# Patient Record
Sex: FEMALE | Race: WHITE | NOT HISPANIC OR LATINO | Employment: OTHER | URBAN - METROPOLITAN AREA
[De-identification: names, ages, dates, MRNs, and addresses within clinical notes are randomized per-mention and may not be internally consistent; named-entity substitution may affect disease eponyms.]

---

## 2024-01-29 ENCOUNTER — TELEPHONE (OUTPATIENT)
Dept: PRIMARY CARE | Facility: CLINIC | Age: 69
End: 2024-01-29
Payer: MEDICARE

## 2024-01-29 DIAGNOSIS — R19.7 DIARRHEA OF PRESUMED INFECTIOUS ORIGIN: Primary | ICD-10-CM

## 2024-01-29 NOTE — TELEPHONE ENCOUNTER
Pt states went to UC 3 wks ago,dx bronchitis, given steroids. Had Colonoscopy 1/22 - loose stools ever since. Still has cough. Covid test neg 1/28, and neg 1/22.  Please advise.

## 2024-01-29 NOTE — TELEPHONE ENCOUNTER
Advised pt of lab stool orders. Advised pt need to get containers and any instructions from lab at  Lab.

## 2024-01-30 ENCOUNTER — HOSPITAL ENCOUNTER (OUTPATIENT)
Dept: RADIOLOGY | Facility: EXTERNAL LOCATION | Age: 69
Discharge: HOME | End: 2024-01-30
Payer: MEDICARE

## 2024-01-30 DIAGNOSIS — R05.9 COUGH, UNSPECIFIED TYPE: ICD-10-CM

## 2024-02-02 DIAGNOSIS — R53.83 OTHER FATIGUE: Primary | ICD-10-CM

## 2024-02-02 RX ORDER — ATORVASTATIN CALCIUM 10 MG/1
TABLET, FILM COATED ORAL EVERY 24 HOURS
COMMUNITY
Start: 2015-08-26 | End: 2024-02-02 | Stop reason: SDUPTHER

## 2024-02-02 RX ORDER — LEVOTHYROXINE SODIUM 50 UG/1
50 TABLET ORAL
Qty: 90 TABLET | Refills: 3 | Status: SHIPPED | OUTPATIENT
Start: 2024-02-02 | End: 2024-02-05 | Stop reason: SDUPTHER

## 2024-02-02 RX ORDER — PRAMIPEXOLE DIHYDROCHLORIDE 0.5 MG/1
TABLET ORAL EVERY 24 HOURS
COMMUNITY
Start: 2020-02-10 | End: 2024-02-02 | Stop reason: SDUPTHER

## 2024-02-02 RX ORDER — LEVOTHYROXINE SODIUM 50 UG/1
TABLET ORAL EVERY 24 HOURS
COMMUNITY
Start: 2017-03-06 | End: 2024-02-02 | Stop reason: SDUPTHER

## 2024-02-02 RX ORDER — AMLODIPINE AND BENAZEPRIL HYDROCHLORIDE 5; 10 MG/1; MG/1
1 CAPSULE ORAL DAILY
Qty: 90 CAPSULE | Refills: 3 | Status: SHIPPED | OUTPATIENT
Start: 2024-02-02 | End: 2024-02-05 | Stop reason: SDUPTHER

## 2024-02-02 RX ORDER — PRAMIPEXOLE DIHYDROCHLORIDE 0.5 MG/1
0.5 TABLET ORAL 2 TIMES DAILY
Qty: 180 TABLET | Refills: 3 | Status: SHIPPED | OUTPATIENT
Start: 2024-02-02 | End: 2024-02-05 | Stop reason: SDUPTHER

## 2024-02-02 RX ORDER — FENOFIBRATE 150 MG/1
CAPSULE ORAL
COMMUNITY
Start: 2009-11-25 | End: 2024-02-02 | Stop reason: SDUPTHER

## 2024-02-02 RX ORDER — AMLODIPINE AND BENAZEPRIL HYDROCHLORIDE 5; 10 MG/1; MG/1
CAPSULE ORAL
COMMUNITY
Start: 2014-04-04 | End: 2024-02-02 | Stop reason: SDUPTHER

## 2024-02-02 RX ORDER — FENOFIBRATE 150 MG/1
134 CAPSULE ORAL DAILY
Qty: 90 CAPSULE | Refills: 3 | Status: SHIPPED | OUTPATIENT
Start: 2024-02-02 | End: 2024-02-05

## 2024-02-02 RX ORDER — ATORVASTATIN CALCIUM 10 MG/1
10 TABLET, FILM COATED ORAL DAILY
Qty: 90 TABLET | Refills: 3 | Status: SHIPPED | OUTPATIENT
Start: 2024-02-02 | End: 2024-02-05 | Stop reason: SDUPTHER

## 2024-02-05 ENCOUNTER — TELEPHONE (OUTPATIENT)
Dept: PRIMARY CARE | Facility: CLINIC | Age: 69
End: 2024-02-05
Payer: MEDICARE

## 2024-02-05 DIAGNOSIS — R53.83 OTHER FATIGUE: ICD-10-CM

## 2024-02-05 RX ORDER — FENOFIBRATE 134 MG/1
134 CAPSULE ORAL
Qty: 90 CAPSULE | Refills: 3 | Status: SHIPPED | OUTPATIENT
Start: 2024-02-05

## 2024-02-05 RX ORDER — FENOFIBRATE 134 MG/1
CAPSULE ORAL
COMMUNITY
Start: 2018-03-28 | End: 2024-02-05 | Stop reason: SDUPTHER

## 2024-02-05 RX ORDER — LEVOTHYROXINE SODIUM 50 UG/1
50 TABLET ORAL
Qty: 90 TABLET | Refills: 3 | Status: SHIPPED | OUTPATIENT
Start: 2024-02-05

## 2024-02-05 RX ORDER — PRAMIPEXOLE DIHYDROCHLORIDE 0.5 MG/1
0.5 TABLET ORAL 2 TIMES DAILY
Qty: 180 TABLET | Refills: 3 | Status: SHIPPED | OUTPATIENT
Start: 2024-02-05

## 2024-02-05 RX ORDER — AMLODIPINE AND BENAZEPRIL HYDROCHLORIDE 5; 10 MG/1; MG/1
1 CAPSULE ORAL DAILY
Qty: 90 CAPSULE | Refills: 3 | Status: SHIPPED | OUTPATIENT
Start: 2024-02-05

## 2024-02-05 RX ORDER — ATORVASTATIN CALCIUM 10 MG/1
10 TABLET, FILM COATED ORAL DAILY
Qty: 90 TABLET | Refills: 3 | Status: SHIPPED | OUTPATIENT
Start: 2024-02-05

## 2024-02-05 NOTE — TELEPHONE ENCOUNTER
Refills    [facility] mailorder    Amlodipine -hydrochlorothiazide 5-10 mg  Atorvastatin 10 mg  Fenofibrate 134 mg cap  Pramipexole dihyrochloride 0.5 mg tab  Synthroid 50 mcg tab    90 days w./ 3 refills

## 2024-06-12 ENCOUNTER — LAB (OUTPATIENT)
Dept: LAB | Facility: LAB | Age: 69
End: 2024-06-12
Payer: MEDICARE

## 2024-06-12 ENCOUNTER — OFFICE VISIT (OUTPATIENT)
Dept: PRIMARY CARE | Facility: CLINIC | Age: 69
End: 2024-06-12
Payer: MEDICARE

## 2024-06-12 VITALS
TEMPERATURE: 97.2 F | BODY MASS INDEX: 24.12 KG/M2 | DIASTOLIC BLOOD PRESSURE: 80 MMHG | SYSTOLIC BLOOD PRESSURE: 122 MMHG | HEART RATE: 78 BPM | WEIGHT: 154 LBS | OXYGEN SATURATION: 97 %

## 2024-06-12 DIAGNOSIS — E11.69 TYPE 2 DIABETES MELLITUS WITH OTHER SPECIFIED COMPLICATION, WITHOUT LONG-TERM CURRENT USE OF INSULIN (MULTI): ICD-10-CM

## 2024-06-12 DIAGNOSIS — Z01.89 ENCOUNTER FOR ROUTINE LABORATORY TESTING: Primary | ICD-10-CM

## 2024-06-12 LAB
ANION GAP SERPL CALC-SCNC: 12 MMOL/L
BUN SERPL-MCNC: 22 MG/DL (ref 8–25)
CALCIUM SERPL-MCNC: 10.6 MG/DL (ref 8.5–10.4)
CHLORIDE SERPL-SCNC: 100 MMOL/L (ref 97–107)
CO2 SERPL-SCNC: 27 MMOL/L (ref 24–31)
CREAT SERPL-MCNC: 0.8 MG/DL (ref 0.4–1.6)
EGFRCR SERPLBLD CKD-EPI 2021: 80 ML/MIN/1.73M*2
EST. AVERAGE GLUCOSE BLD GHB EST-MCNC: 226 MG/DL
GLUCOSE SERPL-MCNC: 223 MG/DL (ref 65–99)
HBA1C MFR BLD: 9.5 %
POC HEMOGLOBIN A1C: 9.4 % (ref 4.2–6.5)
POTASSIUM SERPL-SCNC: 5.8 MMOL/L (ref 3.4–5.1)
SODIUM SERPL-SCNC: 139 MMOL/L (ref 133–145)

## 2024-06-12 PROCEDURE — 83036 HEMOGLOBIN GLYCOSYLATED A1C: CPT

## 2024-06-12 PROCEDURE — 99214 OFFICE O/P EST MOD 30 MIN: CPT | Performed by: LICENSED PRACTICAL NURSE

## 2024-06-12 PROCEDURE — 1125F AMNT PAIN NOTED PAIN PRSNT: CPT | Performed by: LICENSED PRACTICAL NURSE

## 2024-06-12 PROCEDURE — 3079F DIAST BP 80-89 MM HG: CPT | Performed by: LICENSED PRACTICAL NURSE

## 2024-06-12 PROCEDURE — 1160F RVW MEDS BY RX/DR IN RCRD: CPT | Performed by: LICENSED PRACTICAL NURSE

## 2024-06-12 PROCEDURE — 1036F TOBACCO NON-USER: CPT | Performed by: LICENSED PRACTICAL NURSE

## 2024-06-12 PROCEDURE — 83036 HEMOGLOBIN GLYCOSYLATED A1C: CPT | Mod: QW | Performed by: LICENSED PRACTICAL NURSE

## 2024-06-12 PROCEDURE — 80048 BASIC METABOLIC PNL TOTAL CA: CPT

## 2024-06-12 PROCEDURE — 36415 COLL VENOUS BLD VENIPUNCTURE: CPT

## 2024-06-12 PROCEDURE — 1159F MED LIST DOCD IN RCRD: CPT | Performed by: LICENSED PRACTICAL NURSE

## 2024-06-12 PROCEDURE — 3074F SYST BP LT 130 MM HG: CPT | Performed by: LICENSED PRACTICAL NURSE

## 2024-06-12 RX ORDER — METFORMIN HYDROCHLORIDE 500 MG/1
500 TABLET ORAL
Qty: 90 TABLET | Refills: 3 | Status: SHIPPED | OUTPATIENT
Start: 2024-06-12

## 2024-06-12 RX ORDER — FERROUS SULFATE 325(65) MG
325 TABLET ORAL
COMMUNITY

## 2024-06-12 ASSESSMENT — ENCOUNTER SYMPTOMS
POLYPHAGIA: 1
POLYDIPSIA: 0
FATIGUE: 1

## 2024-06-12 ASSESSMENT — PATIENT HEALTH QUESTIONNAIRE - PHQ9
SUM OF ALL RESPONSES TO PHQ9 QUESTIONS 1 AND 2: 0
1. LITTLE INTEREST OR PLEASURE IN DOING THINGS: NOT AT ALL
2. FEELING DOWN, DEPRESSED OR HOPELESS: NOT AT ALL

## 2024-06-12 ASSESSMENT — PAIN SCALES - GENERAL: PAINLEVEL: 4

## 2024-06-12 NOTE — PROGRESS NOTES
"[facility]: [facility]  PROGRESS NOTE      Tavia Banks is a 68 y.o. female that is presenting today for Follow-up.      Subjective   Ms. Banks is a 68yr old female who presents to the office for has a PMH of prediabetes, hypertension and hypothyroidism. Ms. Banks reports being tired \"all the time\". She also shares that her glucose readings are generally high with readings from 279-300s when she randomly checks her sugar level. She reports being diagnosed with pre-diabetes a few years ago. Since that time she has not had much follow up on the issue. She reports \"always being hungry\", however she denies increased thirst or urinary frequency.      Review of Systems   Constitutional:  Positive for fatigue.   Endocrine: Positive for polyphagia. Negative for polydipsia and polyuria.      Objective   Vitals:    06/12/24 0834   BP: 122/80   Pulse: 78   Temp: 36.2 °C (97.2 °F)   SpO2: 97%      Body mass index is 24.12 kg/m².  Physical Exam  Constitutional:       General: She is not in acute distress.     Appearance: She is not ill-appearing, toxic-appearing or diaphoretic.   Cardiovascular:      Rate and Rhythm: Normal rate and regular rhythm.      Pulses: Normal pulses.      Heart sounds: Normal heart sounds.   Pulmonary:      Effort: Pulmonary effort is normal. No respiratory distress.      Breath sounds: No wheezing or rales.   Abdominal:      General: Bowel sounds are normal. There is no distension.   Skin:     General: Skin is warm and dry.       Diagnostic Results   Lab Results   Component Value Date    GLUCOSE 143 (H) 09/10/2021    CALCIUM 9.8 09/10/2021     09/10/2021    K 4.8 09/10/2021    CO2 26 09/10/2021     09/10/2021    BUN 24 09/10/2021    CREATININE 0.8 09/10/2021     Lab Results   Component Value Date    ALT 27 09/10/2021    AST 21 09/10/2021    ALKPHOS 52 09/10/2021    BILITOT 0.4 09/10/2021     Lab Results   Component Value Date    WBC 6.1 09/10/2021    HGB 12.9 " "09/10/2021    HCT 40.4 09/10/2021    MCV 96.0 09/10/2021     09/10/2021     Lab Results   Component Value Date    CHOL 197 09/10/2021    CHOL 196 11/20/2020    CHOL 189 08/05/2019     Lab Results   Component Value Date    HDL 53 09/10/2021    HDL 50.6 11/20/2020    HDL 52.9 08/05/2019     Lab Results   Component Value Date    LDLCALC 116 09/10/2021     Lab Results   Component Value Date    TRIG 138 09/10/2021    TRIG 148 11/20/2020    TRIG 146 08/05/2019     No components found for: \"CHOLHDL\"  Lab Results   Component Value Date    HGBA1C 9.4 (A) 06/12/2024     Other labs not included in the list above were reviewed either before or during this encounter.    History    Past Medical History:   Diagnosis Date    Abnormal results of thyroid function studies 08/09/2019    Abnormal thyroid function test    Essential (primary) hypertension 05/15/2020    Benign essential hypertension    Impaired fasting glucose 03/06/2017    IFG (impaired fasting glucose)    Other specified disorders of synovium and tendon, other site 09/11/2015    Achilles tendinosis    Personal history of other mental and behavioral disorders 10/09/2017    History of depression    Restless legs syndrome 02/10/2020    Restless legs syndrome    Unspecified symptoms and signs involving the genitourinary system 05/15/2020    Urinary symptom or sign     Past Surgical History:   Procedure Laterality Date    COLONOSCOPY  04/08/2014    Complete Colonoscopy    HYSTERECTOMY  04/08/2014    Hysterectomy    TOTAL KNEE ARTHROPLASTY  04/08/2014    Knee Replacement     No family history on file.  Social History     Socioeconomic History    Marital status:      Spouse name: Not on file    Number of children: Not on file    Years of education: Not on file    Highest education level: Not on file   Occupational History    Not on file   Tobacco Use    Smoking status: Never    Smokeless tobacco: Never   Vaping Use    Vaping status: Never Used   Substance and " Sexual Activity    Alcohol use: Never    Drug use: Never    Sexual activity: Not on file   Other Topics Concern    Not on file   Social History Narrative    Not on file     Social Determinants of Health     Financial Resource Strain: Not on file   Food Insecurity: Not on file   Transportation Needs: Not on file   Physical Activity: Not on file   Stress: Not on file   Social Connections: Not on file   Intimate Partner Violence: Not on file   Housing Stability: Not on file     No Known Allergies  Current Outpatient Medications on File Prior to Visit   Medication Sig Dispense Refill    amLODIPine-benazepriL (Lotrel) 5-10 mg capsule Take 1 capsule by mouth once daily. 90 capsule 3    atorvastatin (Lipitor) 10 mg tablet Take 1 tablet (10 mg) by mouth once daily. 90 tablet 3    fenofibrate micronized (Lofibra) 134 mg capsule Take 1 capsule (134 mg) by mouth once daily with breakfast. 90 capsule 3    ferrous sulfate, 325 mg ferrous sulfate, (iron) tablet Take 1 tablet by mouth once daily with breakfast.      levothyroxine (Synthroid, Levoxyl) 50 mcg tablet Take 1 tablet (50 mcg) by mouth once daily in the morning. Take before meals. 90 tablet 3    pramipexole (Mirapex) 0.5 mg tablet Take 1 tablet (0.5 mg) by mouth 2 times a day. 180 tablet 3     No current facility-administered medications on file prior to visit.     Immunization History   Administered Date(s) Administered    Moderna SARS-CoV-2 Vaccination 03/02/2021, 03/30/2021    Pfizer COVID-19 vaccine, Fall 2023, 12 years and older, (30mcg/0.3mL) 10/18/2023    Pfizer COVID-19 vaccine, bivalent, age 12 years and older (30 mcg/0.3 mL) 01/09/2023, 07/05/2023    Pfizer Gray Cap SARS-CoV-2 05/18/2022    Pfizer Purple Cap SARS-CoV-2 11/12/2021     Patient's medical history was reviewed and updated either before or during this encounter.       Assessment/Plan   Problem List Items Addressed This Visit    None  Visit Diagnoses       Encounter for routine laboratory testing    -   Primary    Type 2 diabetes mellitus with other specified complication, without long-term current use of insulin (Multi)        Relevant Medications    metFORMIN (Glucophage) 500 mg tablet    Other Relevant Orders    Hemoglobin A1C    Basic Metabolic Panel    POCT glycosylated hemoglobin (Hb A1C) manually resulted (Completed)        Ms. Pruitt A1C is 9.5 today. I will start her on Metformin 500mg daily x 1 week then increase to 500mg BID. I will also order a BMP to evaluate her renal function. Finally I will refer her to Nutritional Services for diabetic education. She will monitor her BS BID and follow back up in the office in 3 weeks.      Herberth Bhatt, APRN-CNP

## 2024-06-13 DIAGNOSIS — E87.5 HYPERKALEMIA: Primary | ICD-10-CM

## 2024-06-14 PROBLEM — E11.9 DIABETES MELLITUS WITHOUT COMPLICATION (MULTI): Status: ACTIVE | Noted: 2024-06-14

## 2024-06-14 PROBLEM — L57.9 SKIN CHANGES DUE TO CHRONIC EXPOSURE TO NONIONIZING RADIATION, UNSPECIFIED: Status: ACTIVE | Noted: 2017-08-09

## 2024-06-14 PROBLEM — Z20.822 ENCOUNTER FOR LABORATORY TESTING FOR COVID-19 VIRUS: Status: RESOLVED | Noted: 2024-06-14 | Resolved: 2024-06-14

## 2024-06-14 PROBLEM — J20.9 ACUTE BRONCHITIS: Status: RESOLVED | Noted: 2022-10-07 | Resolved: 2024-06-14

## 2024-06-14 PROBLEM — D22.5 MELANOCYTIC NEVI OF TRUNK: Status: ACTIVE | Noted: 2017-08-09

## 2024-06-14 PROBLEM — L82.1 OTHER SEBORRHEIC KERATOSIS: Status: ACTIVE | Noted: 2017-08-09

## 2024-06-14 PROBLEM — R50.9 FEVER: Status: RESOLVED | Noted: 2024-06-14 | Resolved: 2024-06-14

## 2024-06-14 PROBLEM — E55.9 VITAMIN D DEFICIENCY: Status: ACTIVE | Noted: 2024-06-14

## 2024-06-14 PROBLEM — D23.62 OTHER BENIGN NEOPLASM OF SKIN OF LEFT UPPER LIMB, INCLUDING SHOULDER: Status: ACTIVE | Noted: 2017-08-09

## 2024-06-14 PROBLEM — I10 HYPERTENSION: Status: ACTIVE | Noted: 2024-06-14

## 2024-06-14 PROBLEM — N95.1 MENOPAUSAL STATE: Status: ACTIVE | Noted: 2024-06-14

## 2024-06-14 PROBLEM — M19.049 OSTEOARTHRITIS OF HAND: Status: ACTIVE | Noted: 2024-06-14

## 2024-06-14 PROBLEM — R05.9 COUGH, UNSPECIFIED: Status: RESOLVED | Noted: 2022-10-07 | Resolved: 2024-06-14

## 2024-06-14 PROBLEM — I10 BENIGN ESSENTIAL HYPERTENSION: Status: ACTIVE | Noted: 2024-06-14

## 2024-06-14 PROBLEM — M62.81 QUADRICEPS WEAKNESS: Status: ACTIVE | Noted: 2024-06-14

## 2024-06-14 PROBLEM — G25.81 RESTLESS LEGS SYNDROME: Status: ACTIVE | Noted: 2024-06-14

## 2024-06-14 PROBLEM — M21.40 PES PLANUS: Status: ACTIVE | Noted: 2024-06-14

## 2024-06-14 PROBLEM — R35.0 URINARY FREQUENCY: Status: ACTIVE | Noted: 2024-06-14

## 2024-06-14 PROBLEM — L81.4 OTHER MELANIN HYPERPIGMENTATION: Status: ACTIVE | Noted: 2017-08-09

## 2024-06-14 PROBLEM — R73.03 PREDIABETES: Status: ACTIVE | Noted: 2024-06-14

## 2024-06-14 PROBLEM — M19.042 PRIMARY OSTEOARTHRITIS OF BOTH HANDS: Status: ACTIVE | Noted: 2024-06-14

## 2024-06-14 PROBLEM — E78.5 HYPERLIPIDEMIA: Status: ACTIVE | Noted: 2024-06-14

## 2024-06-14 PROBLEM — E03.9 HYPOTHYROIDISM: Status: ACTIVE | Noted: 2024-06-14

## 2024-06-14 PROBLEM — M19.041 PRIMARY OSTEOARTHRITIS OF BOTH HANDS: Status: ACTIVE | Noted: 2024-06-14

## 2024-06-14 PROBLEM — D18.01 HEMANGIOMA OF SKIN AND SUBCUTANEOUS TISSUE: Status: ACTIVE | Noted: 2017-08-09

## 2024-06-14 PROBLEM — Z90.710 HISTORY OF HYSTERECTOMY: Status: RESOLVED | Noted: 2024-06-14 | Resolved: 2024-06-14

## 2024-06-14 PROBLEM — L98.9 SKIN LESIONS: Status: ACTIVE | Noted: 2024-06-14

## 2024-06-15 ENCOUNTER — LAB (OUTPATIENT)
Dept: LAB | Facility: LAB | Age: 69
End: 2024-06-15
Payer: MEDICARE

## 2024-06-15 DIAGNOSIS — E87.5 HYPERKALEMIA: ICD-10-CM

## 2024-06-15 LAB — POTASSIUM SERPL-SCNC: 5.3 MMOL/L (ref 3.4–5.1)

## 2024-06-15 PROCEDURE — 84132 ASSAY OF SERUM POTASSIUM: CPT

## 2024-06-15 PROCEDURE — 36415 COLL VENOUS BLD VENIPUNCTURE: CPT

## 2024-07-03 ENCOUNTER — OFFICE VISIT (OUTPATIENT)
Dept: PRIMARY CARE | Facility: CLINIC | Age: 69
End: 2024-07-03
Payer: MEDICARE

## 2024-07-03 VITALS
HEIGHT: 67 IN | WEIGHT: 150 LBS | OXYGEN SATURATION: 99 % | DIASTOLIC BLOOD PRESSURE: 60 MMHG | SYSTOLIC BLOOD PRESSURE: 126 MMHG | HEART RATE: 58 BPM | BODY MASS INDEX: 23.54 KG/M2 | TEMPERATURE: 97.6 F

## 2024-07-03 DIAGNOSIS — E11.9 DIABETES MELLITUS WITHOUT COMPLICATION (MULTI): ICD-10-CM

## 2024-07-03 DIAGNOSIS — I10 HYPERTENSION, UNSPECIFIED TYPE: Primary | ICD-10-CM

## 2024-07-03 PROCEDURE — 1126F AMNT PAIN NOTED NONE PRSNT: CPT | Performed by: LICENSED PRACTICAL NURSE

## 2024-07-03 PROCEDURE — 3074F SYST BP LT 130 MM HG: CPT | Performed by: LICENSED PRACTICAL NURSE

## 2024-07-03 PROCEDURE — 1159F MED LIST DOCD IN RCRD: CPT | Performed by: LICENSED PRACTICAL NURSE

## 2024-07-03 PROCEDURE — 99214 OFFICE O/P EST MOD 30 MIN: CPT | Performed by: LICENSED PRACTICAL NURSE

## 2024-07-03 PROCEDURE — 3078F DIAST BP <80 MM HG: CPT | Performed by: LICENSED PRACTICAL NURSE

## 2024-07-03 PROCEDURE — 1036F TOBACCO NON-USER: CPT | Performed by: LICENSED PRACTICAL NURSE

## 2024-07-03 PROCEDURE — 3046F HEMOGLOBIN A1C LEVEL >9.0%: CPT | Performed by: LICENSED PRACTICAL NURSE

## 2024-07-03 RX ORDER — AMLODIPINE BESYLATE 10 MG/1
10 TABLET ORAL DAILY
Qty: 30 TABLET | Refills: 5 | Status: SHIPPED | OUTPATIENT
Start: 2024-07-03 | End: 2024-12-30

## 2024-07-03 ASSESSMENT — PAIN SCALES - GENERAL: PAINLEVEL: 0-NO PAIN

## 2024-07-03 NOTE — PROGRESS NOTES
[facility]: [facility]  PROGRESS NOTE      Tavia Banks is a 69 y.o. female that is presenting today for Follow-up (Fu dm).      Subjective   Pt is a 69yr old female presents to the office today for follow up on her BS and elevated potassium. She was last seen on 6/12/24 where she was started on Metformin related to an A1C 9.5%. Ms. Banks had some hesitance to starting the medication although she did agree. We discussed also possibly trying alternative medications once the pharmacist spoke with her about different options available related to her insurance and coverage.     Today she reports taking the metformin 500mg now BID. She shares that her before meals glucose reading this AM was 150mg/dL. This has been her average reading since starting the medication. She shares this number has decreased from her previous readings in the mid to high 200s. She shares that she is having some GI discomfort and loose stools, however her symptoms are manageable.     Finally Ms. Orozco potassium reading after her initial OV was 5.8 with a repeat reading of 5.3. No report of CP, palpitations or weakness.    Additionally       Review of Systems   Gastrointestinal:         GI upset f/t metformin      Objective   Vitals:    07/03/24 0824   BP: 126/60   Pulse: 58   Temp: 36.4 °C (97.6 °F)   SpO2: 99%      Body mass index is 23.49 kg/m².  Physical Exam  Vitals reviewed.   Constitutional:       General: She is not in acute distress.     Appearance: Normal appearance. She is not ill-appearing, toxic-appearing or diaphoretic.   Cardiovascular:      Rate and Rhythm: Regular rhythm.   Pulmonary:      Effort: No respiratory distress.      Breath sounds: No wheezing or rales.   Abdominal:      General: Bowel sounds are normal.      Palpations: Abdomen is soft.   Skin:     General: Skin is warm and dry.       Diagnostic Results   Lab Results   Component Value Date    GLUCOSE 223 (H) 06/12/2024    CALCIUM 10.6 (H)  "06/12/2024     06/12/2024    K 5.3 (H) 06/15/2024    CO2 27 06/12/2024     06/12/2024    BUN 22 06/12/2024    CREATININE 0.80 06/12/2024     Lab Results   Component Value Date    ALT 27 09/10/2021    AST 21 09/10/2021    ALKPHOS 52 09/10/2021    BILITOT 0.4 09/10/2021     Lab Results   Component Value Date    WBC 6.1 09/10/2021    HGB 12.9 09/10/2021    HCT 40.4 09/10/2021    MCV 96.0 09/10/2021     09/10/2021     Lab Results   Component Value Date    CHOL 197 09/10/2021    CHOL 196 11/20/2020    CHOL 189 08/05/2019     Lab Results   Component Value Date    HDL 53 09/10/2021    HDL 50.6 11/20/2020    HDL 52.9 08/05/2019     Lab Results   Component Value Date    LDLCALC 116 09/10/2021     Lab Results   Component Value Date    TRIG 138 09/10/2021    TRIG 148 11/20/2020    TRIG 146 08/05/2019     No components found for: \"CHOLHDL\"  Lab Results   Component Value Date    HGBA1C 9.5 (H) 06/12/2024     Other labs not included in the list above were reviewed either before or during this encounter.    History    Past Medical History:   Diagnosis Date    Abnormal results of thyroid function studies 08/09/2019    Abnormal thyroid function test    Essential (primary) hypertension 05/15/2020    Benign essential hypertension    Impaired fasting glucose 03/06/2017    IFG (impaired fasting glucose)    Other specified disorders of synovium and tendon, other site 09/11/2015    Achilles tendinosis    Personal history of other mental and behavioral disorders 10/09/2017    History of depression    Restless legs syndrome 02/10/2020    Restless legs syndrome    Unspecified symptoms and signs involving the genitourinary system 05/15/2020    Urinary symptom or sign     Past Surgical History:   Procedure Laterality Date    COLONOSCOPY  04/08/2014    Complete Colonoscopy    HYSTERECTOMY  04/08/2014    Hysterectomy    TOTAL KNEE ARTHROPLASTY  04/08/2014    Knee Replacement     No family history on file.  Social History "     Socioeconomic History    Marital status:      Spouse name: Not on file    Number of children: Not on file    Years of education: Not on file    Highest education level: Not on file   Occupational History    Not on file   Tobacco Use    Smoking status: Never     Passive exposure: Never    Smokeless tobacco: Never   Vaping Use    Vaping status: Never Used   Substance and Sexual Activity    Alcohol use: Never    Drug use: Never    Sexual activity: Not on file   Other Topics Concern    Not on file   Social History Narrative    Not on file     Social Determinants of Health     Financial Resource Strain: Not on file   Food Insecurity: Not on file   Transportation Needs: Not on file   Physical Activity: Not on file   Stress: Not on file   Social Connections: Not on file   Intimate Partner Violence: Not on file   Housing Stability: Not on file     No Known Allergies  Current Outpatient Medications on File Prior to Visit   Medication Sig Dispense Refill    atorvastatin (Lipitor) 10 mg tablet Take 1 tablet (10 mg) by mouth once daily. 90 tablet 3    fenofibrate micronized (Lofibra) 134 mg capsule Take 1 capsule (134 mg) by mouth once daily with breakfast. 90 capsule 3    ferrous sulfate, 325 mg ferrous sulfate, (iron) tablet Take 1 tablet by mouth once daily with breakfast.      levothyroxine (Synthroid, Levoxyl) 50 mcg tablet Take 1 tablet (50 mcg) by mouth once daily in the morning. Take before meals. 90 tablet 3    metFORMIN (Glucophage) 500 mg tablet Take 1 tablet (500 mg) by mouth once daily with breakfast. After 1 week increase to 1 tablet in the morning and 1 tablet in the evening 90 tablet 3    pramipexole (Mirapex) 0.5 mg tablet Take 1 tablet (0.5 mg) by mouth 2 times a day. 180 tablet 3    [DISCONTINUED] amLODIPine-benazepriL (Lotrel) 5-10 mg capsule Take 1 capsule by mouth once daily. 90 capsule 3    calcium-vit D3-mag gly-zinc 400 mg-83.3 mcg -166.7 mg capsule Take by mouth once every 24 hours.        No current facility-administered medications on file prior to visit.     Immunization History   Administered Date(s) Administered    Flu vaccine (IIV4), preservative free *Check age/dose* 01/22/2020    Flu vaccine, quadrivalent, high-dose, preservative free, age 65y+ (FLUZONE) 09/25/2020, 01/09/2023    Influenza, Seasonal, Quadrivalent, Adjuvanted 11/12/2021, 10/18/2023    Moderna SARS-CoV-2 Vaccination 03/02/2021, 03/30/2021    Pfizer COVID-19 vaccine, Fall 2023, 12 years and older, (30mcg/0.3mL) 10/18/2023    Pfizer COVID-19 vaccine, bivalent, age 12 years and older (30 mcg/0.3 mL) 01/09/2023, 07/05/2023    Pfizer Gray Cap SARS-CoV-2 05/18/2022    Pfizer Purple Cap SARS-CoV-2 11/12/2021    Pneumococcal polysaccharide vaccine, 23-valent, age 2 years and older (PNEUMOVAX 23) 11/30/2020    Tdap vaccine, age 7 year and older (BOOSTRIX, ADACEL) 02/26/2019    Zoster vaccine, recombinant, adult (SHINGRIX) 01/22/2020, 05/15/2020, 06/12/2020     Patient's medical history was reviewed and updated either before or during this encounter.       Assessment/Plan   Problem List Items Addressed This Visit       Hypertension - Primary    Relevant Medications    amLODIPine (Norvasc) 10 mg tablet    Other Relevant Orders    Basic metabolic panel    Diabetes mellitus without complication (Multi)       We discussed potential treatment alternatives such as Jardiance or Ozempic as stand or alone or add ons to her current Metformin regimen. She will consider her options and let us know next week at her follow up appt which options she prefers.     Regarding her elevated potassium level. I will stop her amlodipine-lisinopril medication and start her on Norvasc 10mg. Her BP today is 126/60. I think this will be just fine for her. We will repeat her BMP and she will follow back up in 1 week.  Herberth Bhatt, APRN-CNP

## 2024-07-09 ENCOUNTER — NURSE TRIAGE (OUTPATIENT)
Dept: PRIMARY CARE | Facility: CLINIC | Age: 69
End: 2024-07-09
Payer: MEDICARE

## 2024-07-10 ENCOUNTER — LAB (OUTPATIENT)
Dept: LAB | Facility: LAB | Age: 69
End: 2024-07-10
Payer: MEDICARE

## 2024-07-10 DIAGNOSIS — I10 HYPERTENSION, UNSPECIFIED TYPE: ICD-10-CM

## 2024-07-10 LAB
ANION GAP SERPL CALC-SCNC: 13 MMOL/L
BUN SERPL-MCNC: 18 MG/DL (ref 8–25)
CALCIUM SERPL-MCNC: 9.9 MG/DL (ref 8.5–10.4)
CHLORIDE SERPL-SCNC: 103 MMOL/L (ref 97–107)
CO2 SERPL-SCNC: 27 MMOL/L (ref 24–31)
CREAT SERPL-MCNC: 0.7 MG/DL (ref 0.4–1.6)
EGFRCR SERPLBLD CKD-EPI 2021: >90 ML/MIN/1.73M*2
GLUCOSE SERPL-MCNC: 154 MG/DL (ref 65–99)
POTASSIUM SERPL-SCNC: 5 MMOL/L (ref 3.4–5.1)
SODIUM SERPL-SCNC: 143 MMOL/L (ref 133–145)

## 2024-07-10 PROCEDURE — 80048 BASIC METABOLIC PNL TOTAL CA: CPT

## 2024-07-10 PROCEDURE — 36415 COLL VENOUS BLD VENIPUNCTURE: CPT

## 2024-07-11 ENCOUNTER — HOSPITAL ENCOUNTER (OUTPATIENT)
Dept: RADIOLOGY | Facility: CLINIC | Age: 69
Discharge: HOME | End: 2024-07-11
Payer: MEDICARE

## 2024-07-11 ENCOUNTER — OFFICE VISIT (OUTPATIENT)
Dept: PRIMARY CARE | Facility: CLINIC | Age: 69
End: 2024-07-11
Payer: MEDICARE

## 2024-07-11 VITALS
TEMPERATURE: 97 F | DIASTOLIC BLOOD PRESSURE: 62 MMHG | WEIGHT: 150 LBS | BODY MASS INDEX: 23.54 KG/M2 | SYSTOLIC BLOOD PRESSURE: 140 MMHG | HEIGHT: 67 IN | HEART RATE: 63 BPM | OXYGEN SATURATION: 99 %

## 2024-07-11 DIAGNOSIS — M25.511 ACUTE PAIN OF RIGHT SHOULDER: Primary | ICD-10-CM

## 2024-07-11 DIAGNOSIS — I10 HYPERTENSION, UNSPECIFIED TYPE: ICD-10-CM

## 2024-07-11 DIAGNOSIS — E11.9 DIABETES MELLITUS WITHOUT COMPLICATION (MULTI): ICD-10-CM

## 2024-07-11 DIAGNOSIS — M25.511 ACUTE PAIN OF RIGHT SHOULDER: ICD-10-CM

## 2024-07-11 PROCEDURE — 3078F DIAST BP <80 MM HG: CPT | Performed by: LICENSED PRACTICAL NURSE

## 2024-07-11 PROCEDURE — 1125F AMNT PAIN NOTED PAIN PRSNT: CPT | Performed by: LICENSED PRACTICAL NURSE

## 2024-07-11 PROCEDURE — 1036F TOBACCO NON-USER: CPT | Performed by: LICENSED PRACTICAL NURSE

## 2024-07-11 PROCEDURE — 73030 X-RAY EXAM OF SHOULDER: CPT | Mod: RIGHT SIDE | Performed by: RADIOLOGY

## 2024-07-11 PROCEDURE — 1159F MED LIST DOCD IN RCRD: CPT | Performed by: LICENSED PRACTICAL NURSE

## 2024-07-11 PROCEDURE — 99214 OFFICE O/P EST MOD 30 MIN: CPT | Performed by: LICENSED PRACTICAL NURSE

## 2024-07-11 PROCEDURE — 3077F SYST BP >= 140 MM HG: CPT | Performed by: LICENSED PRACTICAL NURSE

## 2024-07-11 PROCEDURE — 73030 X-RAY EXAM OF SHOULDER: CPT | Mod: RT

## 2024-07-11 PROCEDURE — 3046F HEMOGLOBIN A1C LEVEL >9.0%: CPT | Performed by: LICENSED PRACTICAL NURSE

## 2024-07-11 RX ORDER — CYCLOBENZAPRINE HCL 5 MG
5 TABLET ORAL NIGHTLY PRN
Qty: 30 TABLET | Refills: 0 | Status: SHIPPED | OUTPATIENT
Start: 2024-07-11 | End: 2024-09-09

## 2024-07-11 RX ORDER — CYCLOBENZAPRINE HCL 5 MG
5 TABLET ORAL NIGHTLY PRN
Qty: 30 TABLET | Refills: 0 | Status: SHIPPED | OUTPATIENT
Start: 2024-07-11 | End: 2024-07-11

## 2024-07-11 ASSESSMENT — PATIENT HEALTH QUESTIONNAIRE - PHQ9
SUM OF ALL RESPONSES TO PHQ9 QUESTIONS 1 AND 2: 0
2. FEELING DOWN, DEPRESSED OR HOPELESS: NOT AT ALL
1. LITTLE INTEREST OR PLEASURE IN DOING THINGS: NOT AT ALL

## 2024-07-11 ASSESSMENT — PAIN SCALES - GENERAL: PAINLEVEL: 6

## 2024-07-11 NOTE — PROGRESS NOTES
[facility]: [facility]  PROGRESS NOTE      Tavia Banks is a 69 y.o. female that is presenting today for Follow-up.      Subjective   Pt is a 69yr old female who presents to the office today for follow up on her diabetes and BP. Ms. Banks was last seen in the office on 7/2/24 for follow up on diabetes and elevated potassium levels. She had been recently started on Metform 500mg BID for an A1C of 9.5%. At her last OV Ms. Banks was offered the option to add Jardiance or Ozempic to her Metformin. She was was not comfortable making a decision and shared that she would like to think over her options. In addition to this Ms. Banks was noted to have a potassium of 5.8 that was repeated and was noted to be 5.3 at the last visit. At that time she was taken off combo norvasc 5mg -lisinopril and was started on Norvasc  10mg. Repeat blood work showed a potassium of 5.0. and fasting glucose of 154.    Today Ms. Banks shares that she continues to take the Metformin 500mg BID. She notes some mild GI upset, but shares that it is tolerable at this time. Ms. Banks shares that she has infrequently checked her BS, however when she does check her fasting number is typically in the 150s. Ms. Banks shares that she is scheduled for follow up with a Dietician 7/22/24.     Finally Ms. Banks shares that she reaching from the  seat of her car to the back of the car with her right arm and felt pain. She has tried Icy Hot and heat to her shoulder, unfortunately this have provided little relief. She is interested in alternate treatments.       Review of Systems   Musculoskeletal:         Right shoulder pain      Objective   Vitals:    07/11/24 0810   BP: 140/62   Pulse: 63   Temp: 36.1 °C (97 °F)   SpO2: 99%      Body mass index is 23.49 kg/m².  Physical Exam  Vitals reviewed.   Constitutional:       General: She is not in acute distress.     Appearance: Normal appearance. She is not ill-appearing, toxic-appearing or  "diaphoretic.   Cardiovascular:      Rate and Rhythm: Normal rate and regular rhythm.   Pulmonary:      Effort: Pulmonary effort is normal. No respiratory distress.      Breath sounds: Normal breath sounds. No decreased breath sounds, wheezing, rhonchi or rales.   Musculoskeletal:      Right shoulder: No swelling, deformity, effusion, laceration, tenderness, bony tenderness or crepitus. Decreased range of motion.        Arms:       Comments: Noted pain with lifting her right shoulder directly up as if putting her right hand into her pocket       Diagnostic Results   Lab Results   Component Value Date    GLUCOSE 154 (H) 07/10/2024    CALCIUM 9.9 07/10/2024     07/10/2024    K 5.0 07/10/2024    CO2 27 07/10/2024     07/10/2024    BUN 18 07/10/2024    CREATININE 0.70 07/10/2024     Lab Results   Component Value Date    ALT 27 09/10/2021    AST 21 09/10/2021    ALKPHOS 52 09/10/2021    BILITOT 0.4 09/10/2021     Lab Results   Component Value Date    WBC 6.1 09/10/2021    HGB 12.9 09/10/2021    HCT 40.4 09/10/2021    MCV 96.0 09/10/2021     09/10/2021     Lab Results   Component Value Date    CHOL 197 09/10/2021    CHOL 196 11/20/2020    CHOL 189 08/05/2019     Lab Results   Component Value Date    HDL 53 09/10/2021    HDL 50.6 11/20/2020    HDL 52.9 08/05/2019     Lab Results   Component Value Date    LDLCALC 116 09/10/2021     Lab Results   Component Value Date    TRIG 138 09/10/2021    TRIG 148 11/20/2020    TRIG 146 08/05/2019     No components found for: \"CHOLHDL\"  Lab Results   Component Value Date    HGBA1C 9.5 (H) 06/12/2024     Other labs not included in the list above were reviewed either before or during this encounter.    History    Past Medical History:   Diagnosis Date    Abnormal results of thyroid function studies 08/09/2019    Abnormal thyroid function test    Essential (primary) hypertension 05/15/2020    Benign essential hypertension    Impaired fasting glucose 03/06/2017    IFG " (impaired fasting glucose)    Other specified disorders of synovium and tendon, other site 09/11/2015    Achilles tendinosis    Personal history of other mental and behavioral disorders 10/09/2017    History of depression    Restless legs syndrome 02/10/2020    Restless legs syndrome    Unspecified symptoms and signs involving the genitourinary system 05/15/2020    Urinary symptom or sign     Past Surgical History:   Procedure Laterality Date    COLONOSCOPY  04/08/2014    Complete Colonoscopy    HYSTERECTOMY  04/08/2014    Hysterectomy    TOTAL KNEE ARTHROPLASTY  04/08/2014    Knee Replacement     No family history on file.  Social History     Socioeconomic History    Marital status:      Spouse name: Not on file    Number of children: Not on file    Years of education: Not on file    Highest education level: Not on file   Occupational History    Not on file   Tobacco Use    Smoking status: Never     Passive exposure: Never    Smokeless tobacco: Never   Vaping Use    Vaping status: Never Used   Substance and Sexual Activity    Alcohol use: Never    Drug use: Never    Sexual activity: Not on file   Other Topics Concern    Not on file   Social History Narrative    Not on file     Social Determinants of Health     Financial Resource Strain: Not on file   Food Insecurity: Not on file   Transportation Needs: Not on file   Physical Activity: Not on file   Stress: Not on file   Social Connections: Not on file   Intimate Partner Violence: Not on file   Housing Stability: Not on file     No Known Allergies  Current Outpatient Medications on File Prior to Visit   Medication Sig Dispense Refill    amLODIPine (Norvasc) 10 mg tablet Take 1 tablet (10 mg) by mouth once daily. 30 tablet 5    atorvastatin (Lipitor) 10 mg tablet Take 1 tablet (10 mg) by mouth once daily. 90 tablet 3    calcium-vit D3-mag gly-zinc 400 mg-83.3 mcg -166.7 mg capsule Take by mouth once every 24 hours.      fenofibrate micronized (Lofibra) 134 mg  capsule Take 1 capsule (134 mg) by mouth once daily with breakfast. 90 capsule 3    ferrous sulfate, 325 mg ferrous sulfate, (iron) tablet Take 1 tablet by mouth once daily with breakfast.      levothyroxine (Synthroid, Levoxyl) 50 mcg tablet Take 1 tablet (50 mcg) by mouth once daily in the morning. Take before meals. 90 tablet 3    metFORMIN (Glucophage) 500 mg tablet Take 1 tablet (500 mg) by mouth once daily with breakfast. After 1 week increase to 1 tablet in the morning and 1 tablet in the evening 90 tablet 3    pramipexole (Mirapex) 0.5 mg tablet Take 1 tablet (0.5 mg) by mouth 2 times a day. 180 tablet 3     No current facility-administered medications on file prior to visit.     Immunization History   Administered Date(s) Administered    Flu vaccine (IIV4), preservative free *Check age/dose* 01/22/2020    Flu vaccine, quadrivalent, high-dose, preservative free, age 65y+ (FLUZONE) 09/25/2020, 01/09/2023    Influenza, Seasonal, Quadrivalent, Adjuvanted 11/12/2021, 10/18/2023    Moderna SARS-CoV-2 Vaccination 03/02/2021, 03/30/2021    Pfizer COVID-19 vaccine, Fall 2023, 12 years and older, (30mcg/0.3mL) 10/18/2023    Pfizer COVID-19 vaccine, bivalent, age 12 years and older (30 mcg/0.3 mL) 01/09/2023, 07/05/2023    Pfizer Gray Cap SARS-CoV-2 05/18/2022    Pfizer Purple Cap SARS-CoV-2 11/12/2021    Pneumococcal polysaccharide vaccine, 23-valent, age 2 years and older (PNEUMOVAX 23) 11/30/2020    Tdap vaccine, age 7 year and older (BOOSTRIX, ADACEL) 02/26/2019    Zoster vaccine, recombinant, adult (SHINGRIX) 01/22/2020, 05/15/2020, 06/12/2020     Patient's medical history was reviewed and updated either before or during this encounter.       Assessment/Plan   Problem List Items Addressed This Visit       Hypertension    Diabetes mellitus without complication (Multi)    Acute pain of right shoulder - Primary    Relevant Medications    cyclobenzaprine (Flexeril) 5 mg tablet    Other Relevant Orders    XR shoulder  right 2+ views     Ms. Banks shares that she would like to stay on the Metformin without adding an additional medication. Ms. Banks would like to speak with the Dietician and focus on diet and exercise to see if this can loose weight and improve her numbers.     Ms. Banks's BP today is 140/62. She prefers not to add an additional medication as well. Her goal is to loose 15lbs within the next 3  months and see if this improves her BP and blood sugars.     Herberth Bhatt, APRN-CNP

## 2024-07-15 DIAGNOSIS — M25.512 ACUTE PAIN OF LEFT SHOULDER: Primary | ICD-10-CM

## 2024-07-22 ENCOUNTER — TELEMEDICINE CLINICAL SUPPORT (OUTPATIENT)
Dept: NUTRITION | Facility: HOSPITAL | Age: 69
End: 2024-07-22
Payer: MEDICARE

## 2024-07-22 VITALS — BODY MASS INDEX: 23.49 KG/M2 | HEIGHT: 67 IN

## 2024-07-22 DIAGNOSIS — E11.69 TYPE 2 DIABETES MELLITUS WITH OTHER SPECIFIED COMPLICATION, WITHOUT LONG-TERM CURRENT USE OF INSULIN (MULTI): ICD-10-CM

## 2024-07-22 PROCEDURE — 97802 MEDICAL NUTRITION INDIV IN: CPT | Mod: 95 | Performed by: DIETITIAN, REGISTERED

## 2024-07-22 NOTE — PATIENT INSTRUCTIONS
Consistency of meals is important!  Eat 3 meals per day with snacks.  Eat an hour after waking up and don't go more than 3-4 hours without eating.    Keep carbohydrates consistent from meal to meal.  Aim for 30-45 g of carbohydrate per meal.  Refer to carbohydrate lists for serving sizes.   Read labels- 15g of total carbohydrate is a serving.     - Choose foods that have fiber listed on the labels.  This helps slow down the release of sugar in your bloodstream.    Include a source of protein with all meals and snack such as meat, fish, nuts, peanut butter, yogurt, cottage cheese or eggs.    -Keep high protein snacks on hand at home- see snack handout.  Try to reduce your weight by 1# per week- this can help lower blood sugar as well.     - see 1200 calorie sample meal plans for ideas.    6.  Drink 64 oz of water other non-caloric beverage.  7.  Aim for 30 minutes of exercise such as walking on most days.

## 2024-07-22 NOTE — PROGRESS NOTES
"Nutrition Assessment     Reason for Visit:  Tavia Banks is a 69 y.o. female who presents for nutrition counseling seeking weight loss.  Pmhx of DMII.    Anthropometrics:    Wt Readings from Last 10 Encounters:   07/11/24 68 kg (150 lb)   07/03/24 68 kg (150 lb)   06/12/24 69.9 kg (154 lb)   05/23/23 69.4 kg (153 lb)   03/16/22 65.3 kg (144 lb)   09/15/21 68.9 kg (152 lb)   03/29/21 69.9 kg (154 lb)   11/30/20 69.4 kg (153 lb)   05/15/20 71.7 kg (158 lb)   02/05/20 71.7 kg (158 lb)     6/12/24: GafF1r-8.5  Anthropometrics  Height: 170.2 cm (5' 7\")         Food And Nutrient Intake:  Food and Nutrient History  Food and Nutrient History: Pt presents for nutrition counseling seeking weight loss in the context of type II DM.  Pt is aiming to lose 15# over the next couple of months at the sugggestion of her physician.  Pt has a significantly elevated HgbA1C and hoping to avoid medication changes by getting better control over her blood sugar levels.  Pt has been educated in the past but is looking for a refresher and sample manu plans with controlled- carbohydrates.  Pt would benefit from following a carbohydrate restriction of 30-45 of carbohydrate per meal.  Pt may consider tracking her intakes on [employer] or similar chapis to help with weight loss efforts.  Suggested she follow a 1200 calorie plan- provided sample menus and healthy snack ideas.     Physical Activities:  Physical Activity  Type of Physical Activity: no planned physical activity     Nutrition Focused Physical Exam:  Deferred- no malnutrition suspected      Energy Needs  Calculated Energy Needs Using Equations  Height: 170.2 cm (5' 7\")  Weight Used for Equation Calculations: 68 kg (149 lb 14.6 oz)  Raman- [place] Equation (Overweight or Obese Patients): 1238  Activity Factor: 1.2  Total Energy Needs: 1485  Estimated Energy Needs  Total Energy Estimated Needs (kCal): 12 kCal  Method for Estimating Needs: -250 kcals        Nutrition Diagnosis   Malnutrition " Diagnosis  Patient has Malnutrition Diagnosis: No  Nutrition Diagnosis  Patient has Nutrition Diagnosis: Yes  Diagnosis Status (1): New  Nutrition Diagnosis 1: Excessive carbohydrate intake  Related to (1): type II DM  As Evidenced by (1): pt report, HgbA1C    Nutrition Interventions/Recommendations   Food and Nutrition Delivery  Meals & Snacks: Carbohydrate-modified diet, Energy-modified diet, Fiber-modified diet, General Healthful Diet, Modify schedule of foods/fluids, Protein-modified diet  Goals: 30-45g carb per meal; 3 meals with 1-2 snacks per day; ~1200 daily kcal intake; ensure adequate protein sources with each meal and snack  Nutrition Education  Nutrition Education Content: Content related nutrition education, Education on nutrition's influence on health, Physical activity guidance  Goals: Instructed on consistent carbohydrate intake, blood sugar goals, exercise, proper portion sizes, label reading, and general healthful nutrition. Instructed on benefits of wt loss with diabetes and heart health. Both verbal and written education provided in the one-on-one setting.Pt verbalized understanding of information provided. All questions answered to pt satisfaction throughout visit.        Patient Instructions   Consistency of meals is important!  Eat 3 meals per day with snacks.  Eat an hour after waking up and don't go more than 3-4 hours without eating.    Keep carbohydrates consistent from meal to meal.  Aim for 30-45 g of carbohydrate per meal.  Refer to carbohydrate lists for serving sizes.   Read labels- 15g of total carbohydrate is a serving.     - Choose foods that have fiber listed on the labels.  This helps slow down the release of sugar in your bloodstream.    Include a source of protein with all meals and snack such as meat, fish, nuts, peanut butter, yogurt, cottage cheese or eggs.    -Keep high protein snacks on hand at home- see snack handout.  Try to reduce your weight by 1# per week- this can  help lower blood sugar as well.     - see 1200 calorie sample meal plans for ideas.    6.  Drink 64 oz of water other non-caloric beverage.  7.  Aim for 30 minutes of exercise such as walking on most days.       Nutrition Monitoring and Evaluation   Food/Nutrient Related History Monitoring  Monitoring and Evaluation Plan: Energy intake, Fluid intake, Meal/snack pattern, Protein intake, Carbohydrate intake, Fiber intake  Criteria: consume 1200 kcal/day  Criteria: aim for 64 oz of water daily, limit sugar sweetened beveraged  Criteria: 3 meals per day with 1-2 snacks between meals  Criteria: ensure adequate protein at each meal and snack ~ 30 g/meal  Estimated carbohydrate intake: Estimated carbohydrate intake  Criteria: 30-45 g carb per meal  Criteria: ensure adequate fiber is present at meals - focus on 1/2 plate nonstarchy vegetables and choosing whole grain foods when able  Knowledge/Beliefs/Attitudes  Monitoring and Evaluation Plan: Physical activity  Criteria: Encouraged regular physical activity including strength training as medically appropriate per AHA guidelines  Body Composition/Growth/Weight History  Monitoring and Evaluation Plan: Weight change  Weight Change: Weight loss  Criteria: 1-2 lb wt loss per week  Biochemical Data, Medical Tests and Procedures  Monitoring and Evaluation Plan: Glucose/endocrine profile  Glucose/Endocrine Profile: Hemoglobin A1c (HgbA1c), Glucose, fasting  Criteria: A1c <7%; fasting blood glucose  mg/dl

## 2024-07-24 ENCOUNTER — EVALUATION (OUTPATIENT)
Dept: PHYSICAL THERAPY | Facility: CLINIC | Age: 69
End: 2024-07-24
Payer: MEDICARE

## 2024-07-24 DIAGNOSIS — M25.512 ACUTE PAIN OF LEFT SHOULDER: Primary | ICD-10-CM

## 2024-07-24 PROCEDURE — 97140 MANUAL THERAPY 1/> REGIONS: CPT | Mod: GP

## 2024-07-24 PROCEDURE — 97162 PT EVAL MOD COMPLEX 30 MIN: CPT | Mod: GP

## 2024-07-24 NOTE — PROGRESS NOTES
Physical Therapy Examination and Treatment Note    Patient Name: Tavia Banks  MRN Number: [id number]  Evaluating Clinician: GRACIELA Thakkar PT  Today's Date: 7/24/2024  Time Calculation  Start Time: 0315  Stop Time: 0400  Time Calculation (min): 45 min    INSURANCE:  Visit Number: 1  [place]ved Visits: [place]  Insurance Info: [employer] MC - NO AUTH / [place] / 96% COVERAGE / OOP $1500 - $105 met / AVAILITY [id number] / ds 7/23/24.     PRECAUTIONS/SPECIAL CONCERNS/RELEVANT PMHX: diabetes, bilateral TKR,     PT CLINICAL PROBLEM: right shoulder pain and limited mobility    Chief Medical Dx code: M25.512 (ICD-10-CM) - Acute pain of left shoulder   1. Acute pain of left shoulder  Referral to Physical Therapy    Follow Up In Physical Therapy          SUBJECTIVE: Reached into back seat of car with her right arm and felt a twinge in her shoulder and pain on 6/23/24. Consulted primary care and referred to PT. Symptoms in superior shoulder on right at AC region. No symptoms at rest, or to sleep. Has pain to reach to pull up her pants, to reach behind her back to buckle the car seat belt. Symptoms 7-8/10 with reaching toward her pant/belt line. No rx for this. Was prescribed muscle relaxer which didn't help. She also tried biofreeze without benefit. Will take Aleve occasionally but not really better. Retired. Baby sits 2 grandchildren 3 times per week. She enjoys sculpture.     TREATMENT:  Symptoms/NPRS before Rx: 7  Symptoms/NPRS after Rx: 2    Manual pec stretch with LTR  Wall pec stretch 3 x 30  Prom to right shoulder  IR rom  Distraction  Inferior glide  Side lying scap mobe  Side lying hand behind back rom     HEP scap retraction, face wall pec stretch, reach hand behind back, retract scapula to reach to side, keep putting on seat belt with right arm for now until fully better. Try not to let the humeral head glide anteriorly in movement and posture.     Timed Codes: (# minutes per modality and 0 if omitted)  Ther-Ex 62981:    Manual 01473: 12 min  [facility]:  Gait 93929:  Stim 72491:  Traction 23711:      OBJECTIVE:  STEADI Fall Risk: 0 (score of 4+ indicates fall risk)   OUTCOME TOOL: ASES 16/30  Protraction  Flexion prom 165 end range discomfort suprahumeral   IR 35 initially to 50 post rx  ER 85 initially to 95 post rx  Tight pecs  Forward shoulder  ER 4+  IR 4+  Supraspinatus 4/5 discomfort  No lags no signs RTC tear some discomfort with supraspinatus and supra acromial symptoms  Anterior glide syndrome humeral head      ASSESSMENT: much improved right hand reach behind back to upper lumbar and to lateral iliac crest post rx today. Pt. To try hep and if she does not fully improve of symptoms return she will phone in to schedule. Pt. Expressly pleased at how much better and more comfortable she could move her shoulder post rx.      Patient presents with int tissue reactivity,  int condition irritability, and int subject reactivity with impairments noted below and decreased level of functional abilities and would benefit from skilled PT to address limitations and achieve goals noted below.     PLAN: PT follow up as needed for right shoulder painful motion restriction of reaching hand to belt line of behind the back. Needs scapular retraction, pec stretch, IR rom, and combined movement training to achieve goals.     Patient/parent/caregiver agreed and consented to plan of care for skilled physical therapy for 3-6 visits more as needed to right shoulder.Physical Therapy to include modalities prn as indicated, therapeutic exercise, manual therapy, neuromuscular re-education, gait and functional performance training, instruction and practice in a home exercise program (HEP) and self-management skills.     CARE PLAN/GOALS: (met, progressing, not progressing)    STG's: (      weeks /      visits )  1  2    LTG's  (   8   weeks /      visits )  1  2 able to wash self comfortably  3 able to pull up pants and buckle seat belt  4 able to  reach hand behind back comfortably  5 The Global Rating of Change Score (GROC) will improve to 5/7 =  “a good deal better” or more.   6 Improve functional outcome tool score (FOTS: HODA, NDI, ASES, ABC, etc.) by > 10 points for Minimally Important Clinical Difference (MICD).  7 Patient/client will be independent with a HEP and self-management skills to further enhance recovery and progress.  8 Patient/client will verbalize >75% symptom reduction for frequency and severity of symptoms and/or > two point reduction of VAS/NPRS.  9 The Patient Specific Functional Scale metric (PSFS) will improve from baseline value to greater than 80% functional.

## 2024-08-28 ENCOUNTER — TREATMENT (OUTPATIENT)
Dept: PHYSICAL THERAPY | Facility: CLINIC | Age: 69
End: 2024-08-28
Payer: MEDICARE

## 2024-08-28 DIAGNOSIS — M25.512 ACUTE PAIN OF LEFT SHOULDER: ICD-10-CM

## 2024-08-28 PROCEDURE — 97110 THERAPEUTIC EXERCISES: CPT | Mod: GP

## 2024-08-28 NOTE — PROGRESS NOTES
Physical Therapy Examination and Treatment Note    Patient Name: Tavia Banks  MRN Number: [id number]  Evaluating Clinician: GRACIELA Thakkar PT  Today's Date: 8/28/2024  Time Calculation  Start Time: 0915  Stop Time: 1000  Time Calculation (min): 45 min    INSURANCE:  Visit Number: 2  Approved Visits: [place]  Insurance Info: [employer] MC - NO AUTH / [place] / 96% COVERAGE / OOP $1500 - $105 met / AVAILITY [id number] / ds 7/23/24.     PRECAUTIONS/SPECIAL CONCERNS/RELEVANT PMHX: diabetes, bilateral TKR,     PT CLINICAL PROBLEM: right shoulder pain and limited mobility    Chief Medical Dx code: M25.512 (ICD-10-CM) - Acute pain of left shoulder   1. Acute pain of left shoulder  Follow Up In Physical Therapy          SUBJECTIVE: Reached into back seat of car with her right arm and felt a twinge in her shoulder and pain on 6/23/24. Consulted primary care and referred to PT. Symptoms in superior shoulder on right at AC region. No symptoms at rest, or to sleep. Has pain to reach to pull up her pants, to reach behind her back to buckle the car seat belt. Symptoms 7-8/10 with reaching toward her pant/belt line. No rx for this. Was prescribed muscle relaxer which didn't help. She also tried biofreeze without benefit. Will take Aleve occasionally but not really better. Retired. Baby sits 2 grandchildren 3 times per week. She enjoys sculpture.     8/28/24 Felt better after last visit for a few days but did not really keep up with the exercises. Shoulder symptom have returned. Now having pain to raise arm/shoulder to the side into abduction motion. For the most part no symptoms at rest. Having an ache at night to sleep on right side. Less achy with positional change to left side lying or back lying.     TREATMENT:  Symptoms/NPRS before Rx: 3  Symptoms/NPRS after Rx: 0    8/28/24  Prom right shoulder  Oscillations  Distraction  ER isometric belted 10 ct 10 reps  IR isometric bleted 10 ct 10 reps  Wall flexion slides 10 reps   Provided  hep as above  Soreness rules education  Guidelines and likely RTC tendopathy/impingement should avoid abduction and sleeping on the right side  Soreness Rules for Exercise and Activities    Your symptoms should be a guide in what to do and how much to do:    1. GREEN LIGHT: If you exercise (strengthen, stretch, joint range of motion) and all feels fine just minimal SORENESS during, or for a few minutes after, then it is okay to increase one or two exercise variables the next time such as repetitions, resistance, intensity, or duration... This is a GREEN LIGHT indicating all is well and safe at the intersection! Symptoms can be in the range of 0-3 on a 0-10 scale and go away within 5-10 minutes after the activity.    2. YELLOW LIGHT: If you exercise and there is some SORENESS OR DISCOMFORT during and after the exercise at your primary area of concern for 1-2 hours which then becomes better/improves then remain at the current amount of exercise/activity. Exercising to the point where symptoms are 3-5 on a 0-10 scale is okay so long as the symptoms ease off in time. This is a YELLOW LIGHT like a precaution or proceed with caution at an intersection! Do not increase the exercise with a yellow light give your body time to adjust and improve and get better at the activity. In time, many yellow lights do improve and become green lights.    3. RED LIGHT: If you exercise and you have PAIN which is more than a 5 on a 0-10 scale, and/or a decrease in the ability to do functional activities (limping, can't raise arm, etc.) and/or you are SORE for many hours post exercise then take 1-2 days off and decrease the exercise/activity by 25-50% the next time around. This is a RED LIGHT like stop at the intersection! If you work on the other activities that feel better in time you can go back to the red light activity and you may feel better and do better with it.     You should NOT have any sharp pain, any pinching pain or any lasting  pain      7/24/24  Manual pec stretch with LTR  Wall pec stretch 3 x 30  Prom to right shoulder  IR rom  Distraction  Inferior glide  Side lying scap mobe  Side lying hand behind back rom     HEP scap retraction, face wall pec stretch, reach hand behind back, retract scapula to reach to side, keep putting on seat belt with right arm for now until fully better. Try not to let the humeral head glide anteriorly in movement and posture.     Timed Codes: (# minutes per modality and 0 if omitted)  Ther-Ex 13552:   Manual 10803: 12 min  NMRe-ed 80639:  Gait 87274:  Stim 81704:  Traction 00822:      OBJECTIVE:  STEADI Fall Risk: 0 (score of 4+ indicates fall risk)   OUTCOME TOOL: ASES 16/30  Protraction  Flexion prom 165 end range discomfort suprahumeral   IR 35 initially to 50 post rx  ER 85 initially to 95 post rx  Tight pecs  Forward shoulder  ER 4+  IR 4+  Supraspinatus 4/5 discomfort  No lags no signs RTC tear some discomfort with supraspinatus and supra acromial symptoms  Anterior glide syndrome humeral head      ASSESSMENT: Re-start PT for right shoulder impingement. Has full prom ER, IR flexion. Some discomfort on resistive ER and abduction and arc of pain on abduction     PLAN:PT 1/week 4-8 visits RTC program, prevention of frozen shoulder, avoid impingement positions and movements.     Patient/parent/caregiver agreed and consented to plan of care for skilled physical therapy for 3-6 visits more as needed to right shoulder.Physical Therapy to include modalities prn as indicated, therapeutic exercise, manual therapy, neuromuscular re-education, gait and functional performance training, instruction and practice in a home exercise program (HEP) and self-management skills.     CARE PLAN/GOALS: (met, progressing, not progressing)    STG's: (      weeks /      visits )  1  2    LTG's  (   8   weeks /      visits )  1  2 able to wash self comfortably  3 able to pull up pants and buckle seat belt  4 able to reach hand  behind back comfortably  5 The Global Rating of Change Score (GROC) will improve to 5/7 =  “a good deal better” or more.   6 Improve functional outcome tool score (FOTS: HODA, NDI, ASES, ABC, etc.) by > 10 points for Minimally Important Clinical Difference (MICD).  7 Patient/client will be independent with a HEP and self-management skills to further enhance recovery and progress.  8 Patient/client will verbalize >75% symptom reduction for frequency and severity of symptoms and/or > two point reduction of VAS/NPRS.  9 The Patient Specific Functional Scale metric (PSFS) will improve from baseline value to greater than 80% functional.

## 2024-09-11 ENCOUNTER — TREATMENT (OUTPATIENT)
Dept: PHYSICAL THERAPY | Facility: CLINIC | Age: 69
End: 2024-09-11
Payer: MEDICARE

## 2024-09-11 DIAGNOSIS — M25.512 ACUTE PAIN OF LEFT SHOULDER: ICD-10-CM

## 2024-09-11 PROCEDURE — 97110 THERAPEUTIC EXERCISES: CPT | Mod: GP

## 2024-09-11 NOTE — PROGRESS NOTES
Physical Therapy Treatment Note    Patient Name: Tavia Banks  MRN Number: [id number]  Evaluating Clinician: GRACIELA Thakkar PT  Today's Date: 9/11/2024  Time Calculation  Start Time: 0915  Stop Time: 1000  Time Calculation (min): 45 min    INSURANCE:  Visit Number: 3  [place]ved Visits: [place]  Insurance Info: [employer] - NO AUTH / [place] / 96% COVERAGE / OOP $1500 - $105 met / AVA[employer] [id number] / ds 7/23/24.     PRECAUTIONS/SPECIAL CONCERNS/RELEVANT PMHX: diabetes, bilateral TKR,     PT CLINICAL PROBLEM: right shoulder pain and limited mobility    Chief Medical Dx code: M25.512 (ICD-10-CM) - Acute pain of left shoulder   1. Acute pain of left shoulder  Follow Up In Physical Therapy          SUBJECTIVE: Reached into back seat of car with her right arm and felt a twinge in her shoulder and pain on 6/23/24. Consulted primary care and referred to PT. Symptoms in superior shoulder on right at AC region. No symptoms at rest, or to sleep. Has pain to reach to pull up her pants, to reach behind her back to buckle the car seat belt. Symptoms 7-8/10 with reaching toward her pant/belt line. No rx for this. Was prescribed muscle relaxer which didn't help. She also tried biofreeze without benefit. Will take Aleve occasionally but not really better. Retired. Baby sits 2 grandchildren 3 times per week. She enjoys sculpture.     8/28/24 Felt better after last visit for a few days but did not really keep up with the exercises. Shoulder symptom have returned. Now having pain to raise arm/shoulder to the side into abduction motion. For the most part no symptoms at rest. Having an ache at night to sleep on right side. Less achy with positional change to left side lying or back lying.     9/11/24 Having the most difficulty with right shoulder extension such as fastening belt and also reaching hand behind back on that side    TREATMENT:  Symptoms/NPRS before Rx: 0 at rest  Symptoms/NPRS after Rx: 0    9/11/24  [facility] RTC exercises do's  and don'ts and guidelines  Banded IR  Banded ER  Banded ext to neutral with scap retraction  Tubing practice of the above  Tubing walk out isometrics 10 ct ER and 10 ct IR 10 reps    8/28/24  Prom right shoulder  Oscillations  Distraction  ER isometric belted 10 ct 10 reps  IR isometric belted 10 ct 10 reps  Wall flexion slides 10 reps   Provided hep as above  Soreness rules education  Guidelines and likely RTC tendopathy/impingement should avoid abduction and sleeping on the right side  Soreness Rules for Exercise and Activities    Your symptoms should be a guide in what to do and how much to do:    1. GREEN LIGHT: If you exercise (strengthen, stretch, joint range of motion) and all feels fine just minimal SORENESS during, or for a few minutes after, then it is okay to increase one or two exercise variables the next time such as repetitions, resistance, intensity, or duration... This is a GREEN LIGHT indicating all is well and safe at the intersection! Symptoms can be in the range of 0-3 on a 0-10 scale and go away within 5-10 minutes after the activity.    2. YELLOW LIGHT: If you exercise and there is some SORENESS OR DISCOMFORT during and after the exercise at your primary area of concern for 1-2 hours which then becomes better/improves then remain at the current amount of exercise/activity. Exercising to the point where symptoms are 3-5 on a 0-10 scale is okay so long as the symptoms ease off in time. This is a YELLOW LIGHT like a precaution or proceed with caution at an intersection! Do not increase the exercise with a yellow light give your body time to adjust and improve and get better at the activity. In time, many yellow lights do improve and become green lights.    3. RED LIGHT: If you exercise and you have PAIN which is more than a 5 on a 0-10 scale, and/or a decrease in the ability to do functional activities (limping, can't raise arm, etc.) and/or you are SORE for many hours post exercise then take 1-2  days off and decrease the exercise/activity by 25-50% the next time around. This is a RED LIGHT like stop at the intersection! If you work on the other activities that feel better in time you can go back to the red light activity and you may feel better and do better with it.     You should NOT have any sharp pain, any pinching pain or any lasting pain      7/24/24  Manual pec stretch with LTR  Wall pec stretch 3 x 30  Prom to right shoulder  IR rom  Distraction  Inferior glide  Side lying scap mobe  Side lying hand behind back rom     HEP scap retraction, face wall pec stretch, reach hand behind back, retract scapula to reach to side, keep putting on seat belt with right arm for now until fully better. Try not to let the humeral head glide anteriorly in movement and posture.     Timed Codes: (# minutes per modality and 0 if omitted)  Ther-Ex 92392:   Manual 61278: 12 min  NMRe-ed 85734:  Gait 68662:  Stim 50869:  Traction 98274:      OBJECTIVE:  STEADI Fall Risk: 0 (score of 4+ indicates fall risk)   OUTCOME TOOL: ASES 16/30  Protraction  Flexion prom 165 end range discomfort suprahumeral   IR 35 initially to 50 post rx  ER 85 initially to 95 post rx  Tight pecs  Forward shoulder  ER 4+  IR 4+  Supraspinatus 4/5 discomfort  No lags no signs RTC tear some discomfort with supraspinatus and supra acromial symptoms  Anterior glide syndrome humeral head    9/11/24 has full shoulder rom elevation, HBB, etc not currently dealing with motion restriction      ASSESSMENT: Does get some anterior shoulder symptoms c/w subscap and/or LHB and this is reproduced with end ranges of ER and extension and hand behind back.      PLAN: avoid end range ER and ext and hand behind back NV add hand weights for flex 0-90 in IR  PT 1/week 4-8 visits RTC program, prevention of frozen shoulder, avoid impingement positions and movements.     Patient/parent/caregiver agreed and consented to plan of care for skilled physical therapy for 3-6  visits more as needed to right shoulder.Physical Therapy to include modalities prn as indicated, therapeutic exercise, manual therapy, neuromuscular re-education, gait and functional performance training, instruction and practice in a home exercise program (HEP) and self-management skills.     CARE PLAN/GOALS: (met, progressing, not progressing)    STG's: (      weeks /      visits )  1  2    LTG's  (   8   weeks /      visits )  1  2 able to wash self comfortably  3 able to pull up pants and buckle seat belt  4 able to reach hand behind back comfortably  5 The Global Rating of Change Score (GROC) will improve to 5/7 =  “a good deal better” or more.   6 Improve functional outcome tool score (FOTS: HODA, NDI, ASES, ABC, etc.) by > 10 points for Minimally Important Clinical Difference (MICD).  7 Patient/client will be independent with a HEP and self-management skills to further enhance recovery and progress.  8 Patient/client will verbalize >75% symptom reduction for frequency and severity of symptoms and/or > two point reduction of VAS/NPRS.  9 The Patient Specific Functional Scale metric (PSFS) will improve from baseline value to greater than 80% functional.

## 2024-09-18 ENCOUNTER — TREATMENT (OUTPATIENT)
Dept: PHYSICAL THERAPY | Facility: CLINIC | Age: 69
End: 2024-09-18
Payer: MEDICARE

## 2024-09-18 DIAGNOSIS — M25.512 ACUTE PAIN OF LEFT SHOULDER: ICD-10-CM

## 2024-09-18 PROCEDURE — 97110 THERAPEUTIC EXERCISES: CPT | Mod: GP

## 2024-09-18 NOTE — PROGRESS NOTES
Physical Therapy Treatment Note    Patient Name: Tavia Banks  MRN Number: [id number]  Evaluating Clinician: GRACIELA Thakkar PT  Today's Date: 9/18/2024  Time Calculation  Start Time: 1045  Stop Time: 1130  Time Calculation (min): 45 min    INSURANCE:  Visit Number: 3  Approved Visits: [place]  Insurance Info: [employer] - NO AUTH / [place] / 96% COVERAGE / OOP $1500 - $105 met / AVAILITY [id number] / ds 7/23/24.     PRECAUTIONS/SPECIAL CONCERNS/RELEVANT PMHX: diabetes, bilateral TKR,     PT CLINICAL PROBLEM: right shoulder pain and limited mobility    Chief Medical Dx code: M25.512 (ICD-10-CM) - Acute pain of left shoulder   1. Acute pain of left shoulder  Follow Up In Physical Therapy          SUBJECTIVE: Reached into back seat of car with her right arm and felt a twinge in her shoulder and pain on 6/23/24. Consulted primary care and referred to PT. Symptoms in superior shoulder on right at AC region. No symptoms at rest, or to sleep. Has pain to reach to pull up her pants, to reach behind her back to buckle the car seat belt. Symptoms 7-8/10 with reaching toward her pant/belt line. No rx for this. Was prescribed muscle relaxer which didn't help. She also tried biofreeze without benefit. Will take Aleve occasionally but not really better. Retired. Baby sits 2 grandchildren 3 times per week. She enjoys sculpture.     8/28/24 Felt better after last visit for a few days but did not really keep up with the exercises. Shoulder symptom have returned. Now having pain to raise arm/shoulder to the side into abduction motion. For the most part no symptoms at rest. Having an ache at night to sleep on right side. Less achy with positional change to left side lying or back lying.     9/11/24 Having the most difficulty with right shoulder extension such as fastening belt and also reaching hand behind back on that side    9/18/24 Still some pain to reach into right pocket and behind back but otherwise shoulder feeling  good    TREATMENT:  Symptoms/NPRS before Rx: 0 at rest  Symptoms/NPRS after Rx: 0  Timed Codes: (# minutes per modality and 0 if omitted)  Ther-Ex 60089: 44  Manual 92180:   NMRe-ed 73808:  Gait 03911:  Stim 33858:  Traction 62292:    9/18/24  Stick extension rom  Hand behind back rom  Post glide humeral head  Pec minor stretch  Supine HBB IR combo stretch  Side lying extension rom to shoulder  Side lying IR and retraction and thoracic rotation  Supine IR stretch/rom  Standing hand to iliac crest and behind back  Standing dowel extension  HEP supine bridge with HBB IR stretch, dowel extension, hand to ilium IR rom     9/11/24  [facility] RTC exercises do's and don'ts and guidelines  Banded IR  Banded ER  Banded ext to neutral with scap retraction  Tubing practice of the above  Tubing walk out isometrics 10 ct ER and 10 ct IR 10 reps    8/28/24  Prom right shoulder  Oscillations  Distraction  ER isometric belted 10 ct 10 reps  IR isometric belted 10 ct 10 reps  Wall flexion slides 10 reps   Provided hep as above  Soreness rules education  Guidelines and likely RTC tendopathy/impingement should avoid abduction and sleeping on the right side  Soreness Rules for Exercise and Activities    Your symptoms should be a guide in what to do and how much to do:    1. GREEN LIGHT: If you exercise (strengthen, stretch, joint range of motion) and all feels fine just minimal SORENESS during, or for a few minutes after, then it is okay to increase one or two exercise variables the next time such as repetitions, resistance, intensity, or duration... This is a GREEN LIGHT indicating all is well and safe at the intersection! Symptoms can be in the range of 0-3 on a 0-10 scale and go away within 5-10 minutes after the activity.    2. YELLOW LIGHT: If you exercise and there is some SORENESS OR DISCOMFORT during and after the exercise at your primary area of concern for 1-2 hours which then becomes better/improves then remain at the current  amount of exercise/activity. Exercising to the point where symptoms are 3-5 on a 0-10 scale is okay so long as the symptoms ease off in time. This is a YELLOW LIGHT like a precaution or proceed with caution at an intersection! Do not increase the exercise with a yellow light give your body time to adjust and improve and get better at the activity. In time, many yellow lights do improve and become green lights.    3. RED LIGHT: If you exercise and you have PAIN which is more than a 5 on a 0-10 scale, and/or a decrease in the ability to do functional activities (limping, can't raise arm, etc.) and/or you are SORE for many hours post exercise then take 1-2 days off and decrease the exercise/activity by 25-50% the next time around. This is a RED LIGHT like stop at the intersection! If you work on the other activities that feel better in time you can go back to the red light activity and you may feel better and do better with it.     You should NOT have any sharp pain, any pinching pain or any lasting pain      7/24/24  Manual pec stretch with LTR  Wall pec stretch 3 x 30  Prom to right shoulder  IR rom  Distraction  Inferior glide  Side lying scap mobe  Side lying hand behind back rom     HEP scap retraction, face wall pec stretch, reach hand behind back, retract scapula to reach to side, keep putting on seat belt with right arm for now until fully better. Try not to let the humeral head glide anteriorly in movement and posture.     OBJECTIVE:  STEADI Fall Risk: 0 (score of 4+ indicates fall risk)   OUTCOME TOOL: ASES 16/30  Protraction  Flexion prom 165 end range discomfort suprahumeral   IR 35 initially to 50 post rx  ER 85 initially to 95 post rx  Tight pecs  Forward shoulder  ER 4+  IR 4+  Supraspinatus 4/5 discomfort  No lags no signs RTC tear some discomfort with supraspinatus and supra acromial symptoms  Anterior glide syndrome humeral head    9/11/24 has full shoulder rom elevation, HBB, etc not currently  dealing with motion restriction      ASSESSMENT: Some residual tightness and pain to reach right hand to same side ilium/pocket area improved with rx and pain free to reach at end of session    PLAN: avoid end range ER and ext and hand behind back NV add hand weights for flex 0-90 in IR  PT 1/week 4-8 visits RTC program, prevention of frozen shoulder, avoid impingement positions and movements.     Patient/parent/caregiver agreed and consented to plan of care for skilled physical therapy for 3-6 visits more as needed to right shoulder.Physical Therapy to include modalities prn as indicated, therapeutic exercise, manual therapy, neuromuscular re-education, gait and functional performance training, instruction and practice in a home exercise program (HEP) and self-management skills.     CARE PLAN/GOALS: (met, progressing, not progressing)    STG's: (      weeks /      visits )  1  2    LTG's  (   8   weeks /      visits )  1  2 able to wash self comfortably  3 able to pull up pants and buckle seat belt  4 able to reach hand behind back comfortably  5 The Global Rating of Change Score (GROC) will improve to 5/7 =  “a good deal better” or more.   6 Improve functional outcome tool score (FOTS: HODA, NDI, ASES, ABC, etc.) by > 10 points for Minimally Important Clinical Difference (MICD).  7 Patient/client will be independent with a HEP and self-management skills to further enhance recovery and progress.  8 Patient/client will verbalize >75% symptom reduction for frequency and severity of symptoms and/or > two point reduction of VAS/NPRS.  9 The Patient Specific Functional Scale metric (PSFS) will improve from baseline value to greater than 80% functional.

## 2024-09-25 ENCOUNTER — TREATMENT (OUTPATIENT)
Dept: PHYSICAL THERAPY | Facility: CLINIC | Age: 69
End: 2024-09-25
Payer: MEDICARE

## 2024-09-25 DIAGNOSIS — M25.512 ACUTE PAIN OF LEFT SHOULDER: ICD-10-CM

## 2024-09-25 PROCEDURE — 97110 THERAPEUTIC EXERCISES: CPT | Mod: GP

## 2024-09-25 NOTE — PROGRESS NOTES
Physical Therapy Treatment Note    Patient Name: Tavia Banks  MRN Number: [id number]  Evaluating Clinician: GRACIELA Thakkar PT  Today's Date: 9/25/2024  Time Calculation  Start Time: 0915  Stop Time: 0945  Time Calculation (min): 30 min    INSURANCE:  Visit Number: 5  Approved Visits: [place]  Insurance Info: [employer] - NO AUTH / [place] / 96% COVERAGE / OOP $1500 - $105 met / AVAILITY [id number] / ds 7/23/24.     PRECAUTIONS/SPECIAL CONCERNS/RELEVANT PMHX: diabetes, bilateral TKR,     PT CLINICAL PROBLEM: right shoulder pain and limited mobility    Chief Medical Dx code: M25.512 (ICD-10-CM) - Acute pain of left shoulder   1. Acute pain of left shoulder  Follow Up In Physical Therapy          SUBJECTIVE: Reached into back seat of car with her right arm and felt a twinge in her shoulder and pain on 6/23/24. Consulted primary care and referred to PT. Symptoms in superior shoulder on right at AC region. No symptoms at rest, or to sleep. Has pain to reach to pull up her pants, to reach behind her back to buckle the car seat belt. Symptoms 7-8/10 with reaching toward her pant/belt line. No rx for this. Was prescribed muscle relaxer which didn't help. She also tried biofreeze without benefit. Will take Aleve occasionally but not really better. Retired. Baby sits 2 grandchildren 3 times per week. She enjoys sculpture.     8/28/24 Felt better after last visit for a few days but did not really keep up with the exercises. Shoulder symptom have returned. Now having pain to raise arm/shoulder to the side into abduction motion. For the most part no symptoms at rest. Having an ache at night to sleep on right side. Less achy with positional change to left side lying or back lying.     9/11/24 Having the most difficulty with right shoulder extension such as fastening belt and also reaching hand behind back on that side    9/18/24 Still some pain to reach into right pocket and behind back but otherwise shoulder feeling  good    9/25/24 Getting a bit frustrated gets some benefit from PT visits but feels she has to sleep on the right side which makes her shoulder more stiff and uncomfortable    TREATMENT:  Symptoms/NPRS before Rx: 0 at rest  Symptoms/NPRS after Rx: 0  Timed Codes: (# minutes per modality and 0 if omitted)  Ther-Ex 83915: 44  Manual 82449:   NMRe-ed 39701:  Gait 55483:  Stim 87744:  Traction 32932:    9/25/24  Pulleys elevation  Pulleys HABD  Dowel right shoulder extension 20 reps assistive  Dowel right shoulder extension 10 reps active  Scapular retractions 10 reps  Bilateral 1 lbs shoulder flexion pre 10 reps  Bilateral 1 lbs shoulder scaption pre 10 reps  Bilateral banded ER 2 x 10    9/18/24  Stick extension rom  Hand behind back rom  Post glide humeral head  Pec minor stretch  Supine HBB IR combo stretch  Side lying extension rom to shoulder  Side lying IR and retraction and thoracic rotation  Supine IR stretch/rom  Standing hand to iliac crest and behind back  Standing dowel extension  HEP supine bridge with HBB IR stretch, dowel extension, hand to ilium IR rom     9/11/24  [facility] RTC exercises do's and don'ts and guidelines  Banded IR  Banded ER  Banded ext to neutral with scap retraction  Tubing practice of the above  Tubing walk out isometrics 10 ct ER and 10 ct IR 10 reps    8/28/24  Prom right shoulder  Oscillations  Distraction  ER isometric belted 10 ct 10 reps  IR isometric belted 10 ct 10 reps  Wall flexion slides 10 reps   Provided hep as above  Soreness rules education  Guidelines and likely RTC tendopathy/impingement should avoid abduction and sleeping on the right side  Soreness Rules for Exercise and Activities    Your symptoms should be a guide in what to do and how much to do:    1. GREEN LIGHT: If you exercise (strengthen, stretch, joint range of motion) and all feels fine just minimal SORENESS during, or for a few minutes after, then it is okay to increase one or two exercise variables the next  time such as repetitions, resistance, intensity, or duration... This is a GREEN LIGHT indicating all is well and safe at the intersection! Symptoms can be in the range of 0-3 on a 0-10 scale and go away within 5-10 minutes after the activity.    2. YELLOW LIGHT: If you exercise and there is some SORENESS OR DISCOMFORT during and after the exercise at your primary area of concern for 1-2 hours which then becomes better/improves then remain at the current amount of exercise/activity. Exercising to the point where symptoms are 3-5 on a 0-10 scale is okay so long as the symptoms ease off in time. This is a YELLOW LIGHT like a precaution or proceed with caution at an intersection! Do not increase the exercise with a yellow light give your body time to adjust and improve and get better at the activity. In time, many yellow lights do improve and become green lights.    3. RED LIGHT: If you exercise and you have PAIN which is more than a 5 on a 0-10 scale, and/or a decrease in the ability to do functional activities (limping, can't raise arm, etc.) and/or you are SORE for many hours post exercise then take 1-2 days off and decrease the exercise/activity by 25-50% the next time around. This is a RED LIGHT like stop at the intersection! If you work on the other activities that feel better in time you can go back to the red light activity and you may feel better and do better with it.     You should NOT have any sharp pain, any pinching pain or any lasting pain      7/24/24  Manual pec stretch with LTR  Wall pec stretch 3 x 30  Prom to right shoulder  IR rom  Distraction  Inferior glide  Side lying scap mobe  Side lying hand behind back rom     HEP scap retraction, face wall pec stretch, reach hand behind back, retract scapula to reach to side, keep putting on seat belt with right arm for now until fully better. Try not to let the humeral head glide anteriorly in movement and posture.     OBJECTIVE:  STEADI Fall Risk: 0 (score  of 4+ indicates fall risk)   OUTCOME TOOL: ASES 16/30  Protraction  Flexion prom 165 end range discomfort suprahumeral   IR 35 initially to 50 post rx  ER 85 initially to 95 post rx  Tight pecs  Forward shoulder  ER 4+  IR 4+  Supraspinatus 4/5 discomfort  No lags no signs RTC tear some discomfort with supraspinatus and supra acromial symptoms  Anterior glide syndrome humeral head    9/11/24 has full shoulder rom elevation, HBB, etc not currently dealing with motion restriction    9/25/24 HBB arom to upper lumbar    ASSESSMENT: shoulder moving much better today and able to comfortably reach hand behind back to upper lumbar and pt. Expressly pleased by this and most shoulder motions are comfortable except loaded abduction in neutral that improves with UE ER so likely still some mild impingement. Has am stiffness needs to sleep less on right side at this point until shoulder is fully 100% better.     PLAN: Pt to be away on vacation and she will see how she does and schedule as needed keep chart open 4-6 weeks.... achieving goals.   PT 1/week 4-8 visits RTC program, prevention of frozen shoulder, avoid impingement positions and movements.     Patient/parent/caregiver agreed and consented to plan of care for skilled physical therapy for 3-6 visits more as needed to right shoulder.Physical Therapy to include modalities prn as indicated, therapeutic exercise, manual therapy, neuromuscular re-education, gait and functional performance training, instruction and practice in a home exercise program (HEP) and self-management skills.     CARE PLAN/GOALS: (met, progressing, not progressing)    STG's: (      weeks /      visits )  1  2    LTG's  (   8   weeks /      visits )  1  2 able to wash self comfortably  3 able to pull up pants and buckle seat belt  4 able to reach hand behind back comfortably  5 The Global Rating of Change Score (GROC) will improve to 5/7 =  “a good deal better” or more.   6 Improve functional outcome  tool score (FOTS: HODA, NDI, ASES, ABC, etc.) by > 10 points for Minimally Important Clinical Difference (MICD).  7 Patient/client will be independent with a HEP and self-management skills to further enhance recovery and progress.  8 Patient/client will verbalize >75% symptom reduction for frequency and severity of symptoms and/or > two point reduction of VAS/NPRS.  9 The Patient Specific Functional Scale metric (PSFS) will improve from baseline value to greater than 80% functional.

## 2024-10-09 ENCOUNTER — APPOINTMENT (OUTPATIENT)
Dept: PRIMARY CARE | Facility: CLINIC | Age: 69
End: 2024-10-09
Payer: MEDICARE

## 2024-10-10 ENCOUNTER — APPOINTMENT (OUTPATIENT)
Dept: PRIMARY CARE | Facility: CLINIC | Age: 69
End: 2024-10-10
Payer: MEDICARE

## 2024-10-14 ENCOUNTER — APPOINTMENT (OUTPATIENT)
Dept: PRIMARY CARE | Facility: CLINIC | Age: 69
End: 2024-10-14
Payer: MEDICARE

## 2024-10-22 DIAGNOSIS — R53.83 OTHER FATIGUE: ICD-10-CM

## 2024-10-22 RX ORDER — PRAMIPEXOLE DIHYDROCHLORIDE 0.5 MG/1
0.5 TABLET ORAL 2 TIMES DAILY
Qty: 180 TABLET | Refills: 3 | Status: SHIPPED | OUTPATIENT
Start: 2024-10-22

## 2024-11-08 DIAGNOSIS — E11.69 TYPE 2 DIABETES MELLITUS WITH OTHER SPECIFIED COMPLICATION, WITHOUT LONG-TERM CURRENT USE OF INSULIN: ICD-10-CM

## 2024-11-08 RX ORDER — METFORMIN HYDROCHLORIDE 500 MG/1
500 TABLET ORAL
Qty: 90 TABLET | Refills: 3 | Status: SHIPPED | OUTPATIENT
Start: 2024-11-08

## 2024-11-11 ENCOUNTER — DOCUMENTATION (OUTPATIENT)
Dept: PHYSICAL THERAPY | Facility: CLINIC | Age: 69
End: 2024-11-11
Payer: MEDICARE

## 2024-11-11 NOTE — PROGRESS NOTES
Discharge Summary    Name: Tavia Banks  MRN: [id number]  : 1955  Date: 24    Discharge Summary: PT    Lost to follow up charting indicates some benefit during time of care/episode

## 2024-11-21 ASSESSMENT — ENCOUNTER SYMPTOMS
ABDOMINAL PAIN: 0
COUGH: 0
APPETITE CHANGE: 0
DIARRHEA: 0
SHORTNESS OF BREATH: 0
VOMITING: 0
NAUSEA: 0
CHILLS: 0
FEVER: 0
HEADACHES: 0

## 2024-11-21 NOTE — PROGRESS NOTES
[facility]: [facility]  MEDICARE WELLNESS EXAM      Tavia Banks is a 69 y.o. female that is presenting today for Annual Exam (CPE).    Assessment/Plan    Diagnoses and all orders for this visit:  Annual physical exam  -     Referral to Dermatology  Type 2 diabetes mellitus with other specified complication, without long-term current use of insulin  -     POCT glycosylated hemoglobin (Hb A1C) manually resulted  -     CBC and Auto Differential; Future  -     Comprehensive Metabolic Panel; Future  -     Lipid Panel; Future  -     Hemoglobin A1C; Future  -     TSH with reflex to Free T4 if abnormal; Future  -     Albumin-Creatinine Ratio, Urine Random; Future  -     Dexcom G7 Sensor device; Use as directed every 10 days to check blood sugars  Hypertension, unspecified type  Mixed hyperlipidemia  -     Lipid Panel; Future  Hyperglycemia  -     Hemoglobin A1C; Future  Vitamin D deficiency  -     Vitamin D 25-Hydroxy,Total (for eval of Vitamin D levels); Future  Encounter for screening mammogram for breast cancer  -     BI mammo bilateral screening tomosynthesis; Future  Encounter for screening for osteoporosis  -     XR DEXA bone density; Future  Menopause  -     XR DEXA bone density; Future  Other orders  -     Pneumococcal conjugate vaccine, 20-valent (PREVNAR 20)  -     Follow Up In Primary Care - Established; Future    She has been able to bring the A1c down nicely and I think with a continuous glucose monitor she will be able to make some further improvements without additional medication changes although I have a low threshold for starting GLP therapy.  Reviewed the screening and prevention schedule.  Update pneumococcal vaccination.    ADVANCED CARE PLANNING  Advanced Care Planning was discussed with patient:  The patient has an active advanced care plan on file. The patient has an active surrogate decision-maker on file.  Encouraged the patient to confirm that Living Will and Healthcare  Power of  (HCPoA) are accurate and up to date.  Encouraged the patient to confirm that our office be provided a copy of any documentation in the event that anything changes.    ACTIVITIES OF DAILY LIVING  Basic ADLs:  Bathing: Independent, Dressing: Independent, Toileting: Independent, Transferring: Independent, Continence: Independent, Feeding: Independent.    Instrumental ADLs:  Ability to use phone: Independent, Shopping: Independent, Cooking: Independent, House-keeping: Independent, Laundry: Independent, Transportation: Independent, Medication Management: Independent, Finance Management: Independent.    Subjective   HPI  This patient presents today for annual physical, Medicare wellness exam.  Discussed screening/prevention, healthy lifestyle and code status.   Reviewed the patient's wishes regarding decision making.    Consultant visits and notes reviewed: None.    Stable from a functional standpoint in regard to ADLs and IADLs.  No recent falls are reported.    The patient denies chest pain and shortness of breath.  No exertion-provoked or anginal-type symptoms are reported.      Review of Systems   Constitutional:  Negative for appetite change, chills and fever.   Respiratory:  Negative for cough and shortness of breath.    Cardiovascular:  Negative for chest pain.   Gastrointestinal:  Negative for abdominal pain, diarrhea, nausea and vomiting.   Neurological:  Negative for headaches.   All other systems reviewed and are negative.    Objective   Vitals:    11/22/24 1017   BP: 130/64   Pulse: 63   Temp: 36.3 °C (97.3 °F)   SpO2: 99%      Body mass index is 21.93 kg/m².  Physical Exam  Vitals reviewed.   Constitutional:       General: She is not in acute distress.     Appearance: She is not toxic-appearing.   HENT:      Head: Normocephalic and atraumatic.      Mouth/Throat:      Mouth: Mucous membranes are moist.   Eyes:      Pupils: Pupils are equal, round, and reactive to light.   Cardiovascular:       "Rate and Rhythm: Normal rate and regular rhythm.      Heart sounds: No murmur heard.  Pulmonary:      Breath sounds: Normal breath sounds. No wheezing, rhonchi or rales.   Abdominal:      General: There is no distension.      Palpations: Abdomen is soft.   Musculoskeletal:      Right lower leg: No edema.      Left lower leg: No edema.   Neurological:      General: No focal deficit present.      Mental Status: She is alert and oriented to person, place, and time.       Diagnostic Results   Lab Results   Component Value Date    GLUCOSE 154 (H) 07/10/2024    CALCIUM 9.9 07/10/2024     07/10/2024    K 5.0 07/10/2024    CO2 27 07/10/2024     07/10/2024    BUN 18 07/10/2024    CREATININE 0.70 07/10/2024     Lab Results   Component Value Date    ALT 27 09/10/2021    AST 21 09/10/2021    ALKPHOS 52 09/10/2021    BILITOT 0.4 09/10/2021     Lab Results   Component Value Date    WBC 6.1 09/10/2021    HGB 12.9 09/10/2021    HCT 40.4 09/10/2021    MCV 96.0 09/10/2021     09/10/2021     Lab Results   Component Value Date    CHOL 197 09/10/2021    CHOL 196 11/20/2020    CHOL 189 08/05/2019     Lab Results   Component Value Date    HDL 53 09/10/2021    HDL 50.6 11/20/2020    HDL 52.9 08/05/2019     Lab Results   Component Value Date    LDLCALC 116 09/10/2021     Lab Results   Component Value Date    TRIG 138 09/10/2021    TRIG 148 11/20/2020    TRIG 146 08/05/2019     No components found for: \"CHOLHDL\"  Lab Results   Component Value Date    HGBA1C 7.2 (A) 11/22/2024     Other labs not included in the list above reviewed either before or during this encounter.    History   Past Medical History:   Diagnosis Date    Abnormal results of thyroid function studies 08/09/2019    Abnormal thyroid function test    Diabetes mellitus (Multi)     Essential (primary) hypertension 05/15/2020    Benign essential hypertension    Impaired fasting glucose 03/06/2017    IFG (impaired fasting glucose)    Other specified disorders of " synovium and tendon, other site 09/11/2015    Achilles tendinosis    Personal history of other mental and behavioral disorders 10/09/2017    History of depression    Restless legs syndrome 02/10/2020    Restless legs syndrome    Unspecified symptoms and signs involving the genitourinary system 05/15/2020    Urinary symptom or sign     Past Surgical History:   Procedure Laterality Date    COLONOSCOPY  04/08/2014    Complete Colonoscopy    HYSTERECTOMY  04/08/2014    Hysterectomy    TOTAL KNEE ARTHROPLASTY  04/08/2014    Knee Replacement     No family history on file.  Social History     Socioeconomic History    Marital status:      Spouse name: Not on file    Number of children: Not on file    Years of education: Not on file    Highest education level: Not on file   Occupational History    Not on file   Tobacco Use    Smoking status: Never     Passive exposure: Never    Smokeless tobacco: Never   Vaping Use    Vaping status: Never Used   Substance and Sexual Activity    Alcohol use: Never    Drug use: Never    Sexual activity: Not on file   Other Topics Concern    Not on file   Social History Narrative    Not on file     Social Drivers of Health     Financial Resource Strain: Not on file   Food Insecurity: Not on file   Transportation Needs: Not on file   Physical Activity: Not on file   Stress: Not on file   Social Connections: Not on file   Intimate Partner Violence: Not on file   Housing Stability: Not on file     No Known Allergies  Current Outpatient Medications on File Prior to Visit   Medication Sig Dispense Refill    amLODIPine (Norvasc) 10 mg tablet Take 1 tablet (10 mg) by mouth once daily. 30 tablet 5    amoxicillin (Amoxil) 500 mg capsule take 1 capsule by mouth 3 times a day until gone      atorvastatin (Lipitor) 10 mg tablet Take 1 tablet (10 mg) by mouth once daily. 90 tablet 3    calcium-vit D3-mag gly-zinc 400 mg-83.3 mcg -166.7 mg capsule Take by mouth once every 24 hours.      fenofibrate  micronized (Lofibra) 134 mg capsule Take 1 capsule (134 mg) by mouth once daily with breakfast. 90 capsule 3    ferrous sulfate, 325 mg ferrous sulfate, (iron) tablet Take 1 tablet (325 mg) by mouth once daily with breakfast.      ibuprofen 600 mg tablet take 1 tablet by mouth every 6 to 8 hours as needed for pain      levothyroxine (Synthroid, Levoxyl) 50 mcg tablet Take 1 tablet (50 mcg) by mouth once daily in the morning. Take before meals. 90 tablet 3    metFORMIN (Glucophage) 500 mg tablet Take 1 tablet (500 mg) by mouth once daily with breakfast. After 1 week increase to 1 tablet in the morning and 1 tablet in the evening 90 tablet 3    pramipexole (Mirapex) 0.5 mg tablet TAKE 1 TABLET TWICE A  tablet 3     No current facility-administered medications on file prior to visit.     Immunization History   Administered Date(s) Administered    Flu vaccine (IIV4), preservative free *Check age/dose* 01/22/2020    Flu vaccine, quadrivalent, high-dose, preservative free, age 65y+ (FLUZONE) 09/25/2020, 01/09/2023    Flu vaccine, trivalent, preservative free, HIGH-DOSE, age 65y+ (Fluzone) 09/13/2024    Influenza, Seasonal, Quadrivalent, Adjuvanted 11/12/2021, 10/18/2023    Moderna SARS-CoV-2 Vaccination 03/02/2021, 03/30/2021    Pfizer COVID-19 vaccine, 12 years and older, (30mcg/0.3mL) (Comirnaty) 10/18/2023, 09/13/2024    Pfizer COVID-19 vaccine, bivalent, age 12 years and older (30 mcg/0.3 mL) 01/09/2023, 07/05/2023    Pfizer Gray Cap SARS-CoV-2 05/18/2022    Pfizer Purple Cap SARS-CoV-2 11/12/2021    Pneumococcal conjugate vaccine, 20-valent (PREVNAR 20) 11/22/2024    Pneumococcal polysaccharide vaccine, 23-valent, age 2 years and older (PNEUMOVAX 23) 11/30/2020    Tdap vaccine, age 7 year and older (BOOSTRIX, ADACEL) 02/26/2019    Zoster vaccine, recombinant, adult (SHINGRIX) 01/22/2020, 05/15/2020, 06/12/2020     Patient's medical history was reviewed and updated either before or during this encounter.      Ethan Villa MD

## 2024-11-22 ENCOUNTER — OFFICE VISIT (OUTPATIENT)
Dept: PRIMARY CARE | Facility: CLINIC | Age: 69
End: 2024-11-22
Payer: MEDICARE

## 2024-11-22 VITALS
HEIGHT: 67 IN | BODY MASS INDEX: 21.97 KG/M2 | DIASTOLIC BLOOD PRESSURE: 64 MMHG | WEIGHT: 140 LBS | OXYGEN SATURATION: 99 % | SYSTOLIC BLOOD PRESSURE: 130 MMHG | TEMPERATURE: 97.3 F | HEART RATE: 63 BPM

## 2024-11-22 DIAGNOSIS — Z12.31 ENCOUNTER FOR SCREENING MAMMOGRAM FOR BREAST CANCER: ICD-10-CM

## 2024-11-22 DIAGNOSIS — E11.69 TYPE 2 DIABETES MELLITUS WITH OTHER SPECIFIED COMPLICATION, WITHOUT LONG-TERM CURRENT USE OF INSULIN: ICD-10-CM

## 2024-11-22 DIAGNOSIS — E55.9 VITAMIN D DEFICIENCY: ICD-10-CM

## 2024-11-22 DIAGNOSIS — Z13.820 ENCOUNTER FOR SCREENING FOR OSTEOPOROSIS: ICD-10-CM

## 2024-11-22 DIAGNOSIS — I10 HYPERTENSION, UNSPECIFIED TYPE: ICD-10-CM

## 2024-11-22 DIAGNOSIS — Z00.00 ANNUAL PHYSICAL EXAM: Primary | ICD-10-CM

## 2024-11-22 DIAGNOSIS — E78.2 MIXED HYPERLIPIDEMIA: ICD-10-CM

## 2024-11-22 DIAGNOSIS — Z78.0 MENOPAUSE: ICD-10-CM

## 2024-11-22 DIAGNOSIS — R73.9 HYPERGLYCEMIA: ICD-10-CM

## 2024-11-22 LAB — POC HEMOGLOBIN A1C: 7.2 % (ref 4.2–6.5)

## 2024-11-22 PROCEDURE — 90677 PCV20 VACCINE IM: CPT | Performed by: INTERNAL MEDICINE

## 2024-11-22 PROCEDURE — 99215 OFFICE O/P EST HI 40 MIN: CPT | Performed by: INTERNAL MEDICINE

## 2024-11-22 PROCEDURE — 83036 HEMOGLOBIN GLYCOSYLATED A1C: CPT | Performed by: INTERNAL MEDICINE

## 2024-11-22 RX ORDER — IBUPROFEN 600 MG/1
TABLET ORAL
COMMUNITY
Start: 2024-11-14

## 2024-11-22 RX ORDER — BLOOD-GLUCOSE SENSOR
EACH MISCELLANEOUS
Qty: 3 EACH | Refills: 11 | Status: SHIPPED | OUTPATIENT
Start: 2024-11-22

## 2024-11-22 RX ORDER — AMOXICILLIN 500 MG/1
CAPSULE ORAL
COMMUNITY
Start: 2024-11-14

## 2024-11-22 ASSESSMENT — PAIN SCALES - GENERAL: PAINLEVEL_OUTOF10: 3

## 2024-11-25 ENCOUNTER — LAB (OUTPATIENT)
Dept: LAB | Facility: LAB | Age: 69
End: 2024-11-25
Payer: MEDICARE

## 2024-11-25 DIAGNOSIS — E11.69 TYPE 2 DIABETES MELLITUS WITH OTHER SPECIFIED COMPLICATION, WITHOUT LONG-TERM CURRENT USE OF INSULIN: ICD-10-CM

## 2024-11-25 DIAGNOSIS — R73.9 HYPERGLYCEMIA: ICD-10-CM

## 2024-11-25 DIAGNOSIS — E55.9 VITAMIN D DEFICIENCY: ICD-10-CM

## 2024-11-25 DIAGNOSIS — E78.2 MIXED HYPERLIPIDEMIA: ICD-10-CM

## 2024-11-25 LAB
25(OH)D3 SERPL-MCNC: 31 NG/ML (ref 30–100)
ALBUMIN SERPL BCP-MCNC: 4.7 G/DL (ref 3.4–5)
ALP SERPL-CCNC: 54 U/L (ref 33–136)
ALT SERPL W P-5'-P-CCNC: 19 U/L (ref 7–45)
ANION GAP SERPL CALCULATED.3IONS-SCNC: 12 MMOL/L (ref 10–20)
AST SERPL W P-5'-P-CCNC: 14 U/L (ref 9–39)
BASOPHILS # BLD AUTO: 0.06 X10*3/UL (ref 0–0.1)
BASOPHILS NFR BLD AUTO: 0.6 %
BILIRUB SERPL-MCNC: 0.5 MG/DL (ref 0–1.2)
BUN SERPL-MCNC: 16 MG/DL (ref 6–23)
CALCIUM SERPL-MCNC: 9.9 MG/DL (ref 8.6–10.3)
CHLORIDE SERPL-SCNC: 104 MMOL/L (ref 98–107)
CHOLEST SERPL-MCNC: 162 MG/DL (ref 0–199)
CHOLEST/HDLC SERPL: 3.2 {RATIO}
CO2 SERPL-SCNC: 31 MMOL/L (ref 21–32)
CREAT SERPL-MCNC: 0.64 MG/DL (ref 0.5–1.05)
EGFRCR SERPLBLD CKD-EPI 2021: >90 ML/MIN/1.73M*2
EOSINOPHIL # BLD AUTO: 0.25 X10*3/UL (ref 0–0.7)
EOSINOPHIL NFR BLD AUTO: 2.4 %
ERYTHROCYTE [DISTWIDTH] IN BLOOD BY AUTOMATED COUNT: 12.8 % (ref 11.5–14.5)
EST. AVERAGE GLUCOSE BLD GHB EST-MCNC: 163 MG/DL
GLUCOSE SERPL-MCNC: 120 MG/DL (ref 74–99)
HBA1C MFR BLD: 7.3 %
HCT VFR BLD AUTO: 38.4 % (ref 36–46)
HDLC SERPL-MCNC: 50.6 MG/DL
HGB BLD-MCNC: 12.8 G/DL (ref 12–16)
IMM GRANULOCYTES # BLD AUTO: 0.03 X10*3/UL (ref 0–0.7)
IMM GRANULOCYTES NFR BLD AUTO: 0.3 % (ref 0–0.9)
LDLC SERPL CALC-MCNC: 88 MG/DL
LYMPHOCYTES # BLD AUTO: 1.88 X10*3/UL (ref 1.2–4.8)
LYMPHOCYTES NFR BLD AUTO: 18.4 %
MCH RBC QN AUTO: 31.2 PG (ref 26–34)
MCHC RBC AUTO-ENTMCNC: 33.3 G/DL (ref 32–36)
MCV RBC AUTO: 94 FL (ref 80–100)
MONOCYTES # BLD AUTO: 0.68 X10*3/UL (ref 0.1–1)
MONOCYTES NFR BLD AUTO: 6.7 %
NEUTROPHILS # BLD AUTO: 7.31 X10*3/UL (ref 1.2–7.7)
NEUTROPHILS NFR BLD AUTO: 71.6 %
NON HDL CHOLESTEROL: 111 MG/DL (ref 0–149)
NRBC BLD-RTO: 0 /100 WBCS (ref 0–0)
PLATELET # BLD AUTO: 298 X10*3/UL (ref 150–450)
POTASSIUM SERPL-SCNC: 4.8 MMOL/L (ref 3.5–5.3)
PROT SERPL-MCNC: 7.2 G/DL (ref 6.4–8.2)
RBC # BLD AUTO: 4.1 X10*6/UL (ref 4–5.2)
SODIUM SERPL-SCNC: 142 MMOL/L (ref 136–145)
TRIGL SERPL-MCNC: 115 MG/DL (ref 0–149)
TSH SERPL-ACNC: 2.32 MIU/L (ref 0.44–3.98)
VLDL: 23 MG/DL (ref 0–40)
WBC # BLD AUTO: 10.2 X10*3/UL (ref 4.4–11.3)

## 2024-11-25 PROCEDURE — 82306 VITAMIN D 25 HYDROXY: CPT

## 2024-11-25 PROCEDURE — 85025 COMPLETE CBC W/AUTO DIFF WBC: CPT

## 2024-11-25 PROCEDURE — 80061 LIPID PANEL: CPT

## 2024-11-25 PROCEDURE — 36415 COLL VENOUS BLD VENIPUNCTURE: CPT

## 2024-11-25 PROCEDURE — 84443 ASSAY THYROID STIM HORMONE: CPT

## 2024-11-25 PROCEDURE — 83036 HEMOGLOBIN GLYCOSYLATED A1C: CPT

## 2024-11-25 PROCEDURE — 80053 COMPREHEN METABOLIC PANEL: CPT

## 2024-11-25 NOTE — LETTER
November 26, 2024     Tavia Banks  [address]  [place] [address]      Dear Ms. Banks:    Below are the results from your recent visit:    Resulted Orders   CBC and Auto Differential   Result Value Ref Range    WBC 10.2 4.4 - 11.3 x10*3/uL    nRBC 0.0 0.0 - 0.0 /100 WBCs    RBC 4.10 4.00 - 5.20 x10*6/uL    Hemoglobin 12.8 12.0 - 16.0 g/dL    Hematocrit 38.4 36.0 - 46.0 %    MCV 94 80 - 100 fL    MCH 31.2 26.0 - 34.0 pg    MCHC 33.3 32.0 - 36.0 g/dL    RDW 12.8 11.5 - 14.5 %    Platelets 298 150 - 450 x10*3/uL    Neutrophils % 71.6 40.0 - 80.0 %    Immature Granulocytes %, Automated 0.3 0.0 - 0.9 %      Comment:      Immature Granulocyte Count (IG) includes promyelocytes, myelocytes and metamyelocytes but does not include bands. Percent differential counts (%) should be interpreted in the context of the absolute cell counts (cells/UL).    Lymphocytes % 18.4 13.0 - 44.0 %    Monocytes % 6.7 2.0 - 10.0 %    Eosinophils % 2.4 0.0 - 6.0 %    Basophils % 0.6 0.0 - 2.0 %    Neutrophils Absolute 7.31 1.20 - 7.70 x10*3/uL      Comment:      Percent differential counts (%) should be interpreted in the context of the absolute cell counts (cells/uL).    Immature Granulocytes Absolute, Automated 0.03 0.00 - 0.70 x10*3/uL    Lymphocytes Absolute 1.88 1.20 - 4.80 x10*3/uL    Monocytes Absolute 0.68 0.10 - 1.00 x10*3/uL    Eosinophils Absolute 0.25 0.00 - 0.70 x10*3/uL    Basophils Absolute 0.06 0.00 - 0.10 x10*3/uL   Comprehensive Metabolic Panel   Result Value Ref Range    Glucose 120 (H) 74 - 99 mg/dL    Sodium 142 136 - 145 mmol/L    Potassium 4.8 3.5 - 5.3 mmol/L    Chloride 104 98 - 107 mmol/L    Bicarbonate 31 21 - 32 mmol/L    Anion Gap 12 10 - 20 mmol/L    Urea Nitrogen 16 6 - 23 mg/dL    Creatinine 0.64 0.50 - 1.05 mg/dL    eGFR >90 >60 mL/min/1.73m*2      Comment:      Calculations of estimated GFR are performed using the 2021 CKD-EPI Study Refit equation without the race variable for the IDMS-Traceable creatinine  methods.  https://jasn.asnjournals.org/content/early/2021/09/22/ASN.6808779869    Calcium 9.9 8.6 - 10.3 mg/dL    Albumin 4.7 3.4 - 5.0 g/dL    Alkaline Phosphatase 54 33 - 136 U/L    Total Protein 7.2 6.4 - 8.2 g/dL    AST 14 9 - 39 U/L    Bilirubin, Total 0.5 0.0 - 1.2 mg/dL    ALT 19 7 - 45 U/L      Comment:      Patients treated with Sulfasalazine may generate falsely decreased results for ALT.   Lipid Panel   Result Value Ref Range    Cholesterol 162 0 - 199 mg/dL      Comment:            Age      Desirable   Borderline High   High     0-19 Y     0 - 169       170 - 199     >/= 200    20-24 Y     0 - 189       190 - 224     >/= 225         >24 Y     0 - 199       200 - 239     >/= 240   **All ranges are based on fasting samples. Specific   therapeutic targets will vary based on patient-specific   cardiac risk.    Pediatric guidelines reference:Pediatrics 2011, 128(S5).Adult guidelines reference: NCEP ATPIII Guidelines,NATHALY 2001, 258:2486-97    Venipuncture immediately after or during the administration of Metamizole may lead to falsely low results. Testing should be performed immediately prior to Metamizole dosing.    HDL-Cholesterol 50.6 mg/dL      Comment:        Age       Very Low   Low     Normal    High    0-19 Y    < 35      < 40     40-45     ----  20-24 Y    ----     < 40      >45      ----        >24 Y      ----     < 40     40-60      >60      Cholesterol/HDL Ratio 3.2       Comment:        Ref Values  Desirable  < 3.4  High Risk  > 5.0    LDL Calculated 88 <=99 mg/dL      Comment:                                  Near   Borderline      AGE      Desirable  Optimal    High     High     Very High     0-19 Y     0 - 109     ---    110-129   >/= 130     ----    20-24 Y     0 - 119     ---    120-159   >/= 160     ----      >24 Y     0 -  99   100-129  130-159   160-189     >/=190      VLDL 23 0 - 40 mg/dL    Triglycerides 115 0 - 149 mg/dL      Comment:      Age              Desirable        Borderline          High        Very High  SEX:B           mg/dL             mg/dL               mg/dL      mg/dL  <=14D                       ----               ----        ----  15D-365D                    ----               ----        ----  1Y-9Y           0-74               75-99             >=100       ----  10Y-19Y        0-89                            >=130       ----  20Y-24Y        0-114             115-149             >=150      ----  >= 25Y         0-149             150-199             200-499    >=500      Venipuncture immediately after or during the administration of Metamizole may lead to falsely low results. Testing should be performed immediately prior to Metamizole dosing.    Non HDL Cholesterol 111 0 - 149 mg/dL      Comment:            Age       Desirable   Borderline High   High     Very High     0-19 Y     0 - 119       120 - 144     >/= 145    >/= 160    20-24 Y     0 - 149       150 - 189     >/= 190      ----         >24 Y    30 mg/dL above LDL Cholesterol goal     TSH with reflex to Free T4 if abnormal   Result Value Ref Range    Thyroid Stimulating Hormone 2.32 0.44 - 3.98 mIU/L    Narrative    TSH testing is performed using different testing methodology at [facility] than at other [facility] hospitals. Direct result comparisons should only be made within the same method.     I am happy to report that these are normal results.  Any minor variations are not clinically significant.       If you have any questions or concerns, please don't hesitate to call.         Sincerely,    Ethan Villa MD    [facility]

## 2024-12-05 ENCOUNTER — HOSPITAL ENCOUNTER (OUTPATIENT)
Dept: RADIOLOGY | Facility: CLINIC | Age: 69
Discharge: HOME | End: 2024-12-05
Payer: MEDICARE

## 2024-12-05 VITALS — WEIGHT: 140 LBS | BODY MASS INDEX: 21.93 KG/M2

## 2024-12-05 DIAGNOSIS — Z12.31 ENCOUNTER FOR SCREENING MAMMOGRAM FOR BREAST CANCER: ICD-10-CM

## 2024-12-05 DIAGNOSIS — Z13.820 ENCOUNTER FOR SCREENING FOR OSTEOPOROSIS: ICD-10-CM

## 2024-12-05 DIAGNOSIS — Z78.0 MENOPAUSE: ICD-10-CM

## 2024-12-05 PROCEDURE — 77080 DXA BONE DENSITY AXIAL: CPT

## 2024-12-05 PROCEDURE — 77067 SCR MAMMO BI INCL CAD: CPT

## 2024-12-05 PROCEDURE — 77080 DXA BONE DENSITY AXIAL: CPT | Performed by: RADIOLOGY

## 2025-01-21 DIAGNOSIS — I10 HYPERTENSION, UNSPECIFIED TYPE: ICD-10-CM

## 2025-01-21 RX ORDER — AMLODIPINE BESYLATE 10 MG/1
10 TABLET ORAL DAILY
Qty: 90 TABLET | Refills: 3 | Status: SHIPPED | OUTPATIENT
Start: 2025-01-21 | End: 2026-01-16

## 2025-01-27 DIAGNOSIS — I10 HYPERTENSION, UNSPECIFIED TYPE: ICD-10-CM

## 2025-01-27 RX ORDER — AMLODIPINE BESYLATE 10 MG/1
10 TABLET ORAL DAILY
Qty: 90 TABLET | Refills: 3 | Status: SHIPPED | OUTPATIENT
Start: 2025-01-27 | End: 2026-01-22

## 2025-02-27 DIAGNOSIS — R53.83 OTHER FATIGUE: ICD-10-CM

## 2025-02-27 RX ORDER — ATORVASTATIN CALCIUM 10 MG/1
10 TABLET, FILM COATED ORAL DAILY
Qty: 90 TABLET | Refills: 3 | Status: SHIPPED | OUTPATIENT
Start: 2025-02-27

## 2025-03-02 ASSESSMENT — ENCOUNTER SYMPTOMS
FEVER: 0
CHILLS: 0
SHORTNESS OF BREATH: 0
COUGH: 0
NAUSEA: 0
ABDOMINAL PAIN: 0
HEADACHES: 0
VOMITING: 0
APPETITE CHANGE: 0
DIARRHEA: 0

## 2025-03-02 NOTE — PROGRESS NOTES
[facility]: [facility]  PROGRESS NOTE      Tavia Banks is a 69 y.o. female that is presenting today for Follow-up (Cold sores).    Assessment/Plan   Diagnoses and all orders for this visit:  Type 2 diabetes mellitus with other specified complication, without long-term current use of insulin  -     POCT glycosylated hemoglobin (Hb A1C) manually resulted  -     metFORMIN (Glucophage) 500 mg tablet; Take 2 tablets (1,000 mg) by mouth 2 times daily (morning and late afternoon). After 1 week increase to 1 tablet in the morning and 1 tablet in the evening  Hypertension, unspecified type  Mixed hyperlipidemia  Vitamin D deficiency  Other orders  -     Follow Up In Primary Care - Established  -     Follow Up In Primary Care - Established; Future    Long talk about the options and at this point she wants to increase the metformin to maximum dose 1000 mg twice per day and we will regroup at the end of the summer to see if she is making any headway with her glycemic control.  We discussed specifically the use of GLP therapy or SGLT therapy at this point and we did price out the GLP's and she has excellent coverage for these in fact they would be free.  This is likely our next step should the A1c not come down.  She will continue to use a continuous glucose monitor.    Hypertension stable on the amlodipine.    Hyperlipidemia, continue the statin and fenofibrate, lipids monitored.    RLS on the Mirapex.    Hypothyroidism, clinically and chemically euthyroid, continue the levothyroxine.    Reviewed the screening and prevention schedule.  She knows she has to get her eyes checked.    Subjective   HPI  69 y.o. female here for follow up.  No complaints.  She knows that she has not been watching her diet very well and she has been under a lot of stress in terms of relationship issues and also because of babysitting the grandchildren.  She knows that the blood sugar has been high and she does like to use  the continuous glucose monitor.  She is compliant with the metformin 500 mg twice per day.    Review of Systems   Constitutional:  Negative for appetite change, chills and fever.   Respiratory:  Negative for cough and shortness of breath.    Cardiovascular:  Negative for chest pain.   Gastrointestinal:  Negative for abdominal pain, diarrhea, nausea and vomiting.   Neurological:  Negative for headaches.   All other systems reviewed and are negative.     Objective   Vitals:    03/28/25 1108   BP: 130/78   Pulse: 70   Temp: 36.1 °C (96.9 °F)   SpO2: 98%      Body mass index is 23.34 kg/m².  Physical Exam  Vitals reviewed.   Constitutional:       General: She is not in acute distress.     Appearance: She is not toxic-appearing.   HENT:      Head: Normocephalic and atraumatic.      Mouth/Throat:      Mouth: Mucous membranes are moist.   Eyes:      Pupils: Pupils are equal, round, and reactive to light.   Cardiovascular:      Rate and Rhythm: Normal rate and regular rhythm.      Heart sounds: No murmur heard.  Pulmonary:      Breath sounds: Normal breath sounds. No wheezing, rhonchi or rales.   Abdominal:      General: There is no distension.      Palpations: Abdomen is soft.   Musculoskeletal:      Right lower leg: No edema.      Left lower leg: No edema.   Neurological:      General: No focal deficit present.      Mental Status: She is alert and oriented to person, place, and time.       Diagnostic Results   Lab Results   Component Value Date    GLUCOSE 120 (H) 11/25/2024    CALCIUM 9.9 11/25/2024     11/25/2024    K 4.8 11/25/2024    CO2 31 11/25/2024     11/25/2024    BUN 16 11/25/2024    CREATININE 0.64 11/25/2024     Lab Results   Component Value Date    ALT 19 11/25/2024    AST 14 11/25/2024    ALKPHOS 54 11/25/2024    BILITOT 0.5 11/25/2024     Lab Results   Component Value Date    WBC 10.2 11/25/2024    HGB 12.8 11/25/2024    HCT 38.4 11/25/2024    MCV 94 11/25/2024     11/25/2024     Lab  "Results   Component Value Date    CHOL 162 11/25/2024    CHOL 197 09/10/2021    CHOL 196 11/20/2020     Lab Results   Component Value Date    HDL 50.6 11/25/2024    HDL 53 09/10/2021    HDL 50.6 11/20/2020     Lab Results   Component Value Date    LDLCALC 88 11/25/2024    LDLCALC 116 09/10/2021     Lab Results   Component Value Date    TRIG 115 11/25/2024    TRIG 138 09/10/2021    TRIG 148 11/20/2020     No components found for: \"CHOLHDL\"  Lab Results   Component Value Date    HGBA1C 7.5 (A) 03/28/2025     Other labs not included in the list above were reviewed either before or during this encounter.    History    Past Medical History:   Diagnosis Date    Abnormal results of thyroid function studies 08/09/2019    Abnormal thyroid function test    Diabetes mellitus (Multi)     Essential (primary) hypertension 05/15/2020    Benign essential hypertension    Impaired fasting glucose 03/06/2017    IFG (impaired fasting glucose)    Other specified disorders of synovium and tendon, other site 09/11/2015    Achilles tendinosis    Personal history of other mental and behavioral disorders 10/09/2017    History of depression    Restless legs syndrome 02/10/2020    Restless legs syndrome    Unspecified symptoms and signs involving the genitourinary system 05/15/2020    Urinary symptom or sign     Past Surgical History:   Procedure Laterality Date    COLONOSCOPY  04/08/2014    Complete Colonoscopy    HYSTERECTOMY  04/08/2014    Hysterectomy    TOTAL KNEE ARTHROPLASTY  04/08/2014    Knee Replacement     No family history on file.  Social History     Socioeconomic History    Marital status:      Spouse name: Not on file    Number of children: Not on file    Years of education: Not on file    Highest education level: Not on file   Occupational History    Not on file   Tobacco Use    Smoking status: Never     Passive exposure: Never    Smokeless tobacco: Never   Vaping Use    Vaping status: Never Used   Substance and Sexual " Activity    Alcohol use: Never    Drug use: Never    Sexual activity: Not on file   Other Topics Concern    Not on file   Social History Narrative    Not on file     Social Drivers of Health     Financial Resource Strain: Not on file   Food Insecurity: Not on file   Transportation Needs: Not on file   Physical Activity: Not on file   Stress: Not on file   Social Connections: Not on file   Intimate Partner Violence: Not on file   Housing Stability: Not on file     No Known Allergies  Current Outpatient Medications on File Prior to Visit   Medication Sig Dispense Refill    amLODIPine (Norvasc) 10 mg tablet Take 1 tablet (10 mg) by mouth once daily. 90 tablet 3    atorvastatin (Lipitor) 10 mg tablet TAKE 1 TABLET ONCE DAILY 90 tablet 3    calcium-vit D3-mag gly-zinc 400 mg-83.3 mcg -166.7 mg capsule Take by mouth once every 24 hours.      Dexcom G7 Sensor device Use as directed every 10 days to check blood sugars 3 each 11    fenofibrate micronized (Lofibra) 134 mg capsule TAKE 1 CAPSULE ONCE DAILY  WITH BREAKFAST 90 capsule 3    ferrous sulfate, 325 mg ferrous sulfate, (iron) tablet Take 1 tablet (325 mg) by mouth once daily with breakfast.      pramipexole (Mirapex) 0.5 mg tablet TAKE 1 TABLET TWICE A  tablet 3    Synthroid 50 mcg tablet TAKE 1 TABLET DAILY IN THE MORNING BEFORE A MEAL 90 tablet 3    [DISCONTINUED] metFORMIN (Glucophage) 500 mg tablet Take 1 tablet (500 mg) by mouth once daily with breakfast. After 1 week increase to 1 tablet in the morning and 1 tablet in the evening 90 tablet 3    [DISCONTINUED] amoxicillin (Amoxil) 500 mg capsule take 1 capsule by mouth 3 times a day until gone (Patient not taking: Reported on 3/28/2025)      [DISCONTINUED] ibuprofen 600 mg tablet take 1 tablet by mouth every 6 to 8 hours as needed for pain (Patient not taking: Reported on 3/28/2025)       No current facility-administered medications on file prior to visit.     Immunization History   Administered Date(s)  Administered    COVID-19, mRNA, LNP-S, PF, 30 mcg/0.3 mL dose 11/12/2021    Flu vaccine (IIV4), preservative free *Check age/dose* 01/22/2020    Flu vaccine, quadrivalent, high-dose, preservative free, age 65y+ (FLUZONE) 09/25/2020, 01/09/2023    Flu vaccine, trivalent, preservative free, HIGH-DOSE, age 65y+ (Fluzone) 09/13/2024    Influenza, Seasonal, Quadrivalent, Adjuvanted 11/12/2021, 10/18/2023    Moderna SARS-CoV-2 Vaccination 03/02/2021, 03/30/2021    Pfizer COVID-19 vaccine, 12 years and older, (30mcg/0.3mL) (Comirnaty) 10/18/2023, 09/13/2024    Pfizer COVID-19 vaccine, bivalent, age 12 years and older (30 mcg/0.3 mL) 01/09/2023, 07/05/2023    Pfizer Gray Cap SARS-CoV-2 05/18/2022    Pneumococcal conjugate vaccine, 20-valent (PREVNAR 20) 11/22/2024    Pneumococcal polysaccharide vaccine, 23-valent, age 2 years and older (PNEUMOVAX 23) 11/30/2020    Tdap vaccine, age 7 year and older (BOOSTRIX, ADACEL) 02/26/2019    Zoster vaccine, recombinant, adult (SHINGRIX) 01/22/2020, 05/15/2020, 06/12/2020     Patient's medical history was reviewed and updated either before or during this encounter.       Ethan Villa MD

## 2025-03-05 ENCOUNTER — PATIENT MESSAGE (OUTPATIENT)
Dept: PRIMARY CARE | Facility: CLINIC | Age: 70
End: 2025-03-05
Payer: MEDICARE

## 2025-03-05 DIAGNOSIS — F43.9 STRESS: Primary | ICD-10-CM

## 2025-03-17 DIAGNOSIS — R53.83 OTHER FATIGUE: ICD-10-CM

## 2025-03-18 RX ORDER — LEVOTHYROXINE SODIUM 50 UG/1
50 TABLET ORAL
Qty: 90 TABLET | Refills: 3 | Status: SHIPPED | OUTPATIENT
Start: 2025-03-18

## 2025-03-18 RX ORDER — FENOFIBRATE 134 MG/1
CAPSULE ORAL
Qty: 90 CAPSULE | Refills: 3 | Status: SHIPPED | OUTPATIENT
Start: 2025-03-18

## 2025-03-28 ENCOUNTER — OFFICE VISIT (OUTPATIENT)
Dept: PRIMARY CARE | Facility: CLINIC | Age: 70
End: 2025-03-28
Payer: MEDICARE

## 2025-03-28 VITALS
TEMPERATURE: 96.9 F | BODY MASS INDEX: 23.39 KG/M2 | HEIGHT: 67 IN | OXYGEN SATURATION: 98 % | WEIGHT: 149 LBS | DIASTOLIC BLOOD PRESSURE: 78 MMHG | HEART RATE: 70 BPM | SYSTOLIC BLOOD PRESSURE: 130 MMHG

## 2025-03-28 DIAGNOSIS — E78.2 MIXED HYPERLIPIDEMIA: ICD-10-CM

## 2025-03-28 DIAGNOSIS — I10 HYPERTENSION, UNSPECIFIED TYPE: ICD-10-CM

## 2025-03-28 DIAGNOSIS — E11.69 TYPE 2 DIABETES MELLITUS WITH OTHER SPECIFIED COMPLICATION, WITHOUT LONG-TERM CURRENT USE OF INSULIN: Primary | ICD-10-CM

## 2025-03-28 DIAGNOSIS — E55.9 VITAMIN D DEFICIENCY: ICD-10-CM

## 2025-03-28 LAB — POC HEMOGLOBIN A1C: 7.5 % (ref 4.2–6.5)

## 2025-03-28 PROCEDURE — 83036 HEMOGLOBIN GLYCOSYLATED A1C: CPT | Mod: QW | Performed by: INTERNAL MEDICINE

## 2025-03-28 PROCEDURE — 99214 OFFICE O/P EST MOD 30 MIN: CPT | Performed by: INTERNAL MEDICINE

## 2025-03-28 PROCEDURE — 3078F DIAST BP <80 MM HG: CPT | Performed by: INTERNAL MEDICINE

## 2025-03-28 PROCEDURE — 3008F BODY MASS INDEX DOCD: CPT | Performed by: INTERNAL MEDICINE

## 2025-03-28 PROCEDURE — 1159F MED LIST DOCD IN RCRD: CPT | Performed by: INTERNAL MEDICINE

## 2025-03-28 PROCEDURE — 1036F TOBACCO NON-USER: CPT | Performed by: INTERNAL MEDICINE

## 2025-03-28 PROCEDURE — 95251 CONT GLUC MNTR ANALYSIS I&R: CPT | Performed by: INTERNAL MEDICINE

## 2025-03-28 PROCEDURE — 3075F SYST BP GE 130 - 139MM HG: CPT | Performed by: INTERNAL MEDICINE

## 2025-03-28 PROCEDURE — 1125F AMNT PAIN NOTED PAIN PRSNT: CPT | Performed by: INTERNAL MEDICINE

## 2025-03-28 PROCEDURE — G2211 COMPLEX E/M VISIT ADD ON: HCPCS | Performed by: INTERNAL MEDICINE

## 2025-03-28 RX ORDER — METFORMIN HYDROCHLORIDE 500 MG/1
1000 TABLET ORAL
Qty: 360 TABLET | Refills: 3 | Status: SHIPPED | OUTPATIENT
Start: 2025-03-28

## 2025-03-28 ASSESSMENT — PATIENT HEALTH QUESTIONNAIRE - PHQ9
2. FEELING DOWN, DEPRESSED OR HOPELESS: NOT AT ALL
1. LITTLE INTEREST OR PLEASURE IN DOING THINGS: NOT AT ALL
SUM OF ALL RESPONSES TO PHQ9 QUESTIONS 1 AND 2: 0

## 2025-03-28 ASSESSMENT — PAIN SCALES - GENERAL: PAINLEVEL_OUTOF10: 5

## 2025-07-31 ASSESSMENT — ENCOUNTER SYMPTOMS
ABDOMINAL PAIN: 0
DIZZINESS: 0
BLURRED VISION: 0
NERVOUS/ANXIOUS: 1
WEIGHT LOSS: 0
SWEATS: 0
POLYDIPSIA: 0
VISUAL CHANGE: 0
FATIGUE: 1
HEADACHES: 0
BLACKOUTS: 0
HUNGER: 1
POLYPHAGIA: 1
TREMORS: 0
SEIZURES: 0
WEAKNESS: 0
SHORTNESS OF BREATH: 0
SPEECH DIFFICULTY: 0
FEVER: 0
CONFUSION: 0

## 2025-07-31 NOTE — PROGRESS NOTES
[facility]: [facility]  PROGRESS NOTE      Tavia Banks is a 70 y.o. female that is presenting today for No chief complaint on file..    Assessment/Plan   Diagnoses and all orders for this visit:  Type 2 diabetes mellitus with other specified complication, without long-term current use of insulin  Hypertension, unspecified type  Mixed hyperlipidemia  Vitamin D deficiency    Diabetes mellitus type 2 with hyperglycemia.  Continue metformin.  Low threshold for starting GLP or SGLT therapy.  She really is not want to start another medication at this time and I agree that she can do better in terms of diet and lifestyle efforts.  She is not obese.  Will refer for diabetic education.    Hyperlipidemia on atorvastatin.    Hypertension, continue the amlodipine.    Hypothyroidism on levothyroxine.  Will be getting the thyroid function test rechecked with her annual laboratory studies prior to the upcoming visit.    Anxiety.  We talked about this at length.  She has quite a few stressors in her life.  She is open to talking to a psychologist now and a referral was placed in that regard.  Will start SSRI therapy.    Reviewed the screening and prevention schedule.    Subjective   HPI  70 y.o. female here for follow up.  She notes that the blood sugar is not as good as it should be and she is compliant with the metformin but has a hard time with diet and exercise efforts.  She says that she is not eating right and she is not doing enough exercise.  She is stressed out and she thinks that that the mental stress is part of the reason as to why she is not compliant with lifestyle efforts.  Her friends agree.  Her daughter takes an SSRI and tells her that she should be on 1.    Review of Systems   Constitutional:  Negative for fever.   Respiratory:  Negative for shortness of breath.    Cardiovascular:  Negative for chest pain.   Gastrointestinal:  Negative for abdominal pain.   All other systems reviewed  "and are negative.     Objective   There were no vitals filed for this visit.   There is no height or weight on file to calculate BMI.  Physical Exam  Vitals reviewed.   Constitutional:       Appearance: Normal appearance.     Cardiovascular:      Rate and Rhythm: Normal rate and regular rhythm.      Heart sounds: No murmur heard.  Pulmonary:      Breath sounds: Normal breath sounds. No wheezing, rhonchi or rales.     Musculoskeletal:      Right lower leg: No edema.      Left lower leg: No edema.       Diagnostic Results   Lab Results   Component Value Date    GLUCOSE 120 (H) 11/25/2024    CALCIUM 9.9 11/25/2024     11/25/2024    K 4.8 11/25/2024    CO2 31 11/25/2024     11/25/2024    BUN 16 11/25/2024    CREATININE 0.64 11/25/2024     Lab Results   Component Value Date    ALT 19 11/25/2024    AST 14 11/25/2024    ALKPHOS 54 11/25/2024    BILITOT 0.5 11/25/2024     Lab Results   Component Value Date    WBC 10.2 11/25/2024    HGB 12.8 11/25/2024    HCT 38.4 11/25/2024    MCV 94 11/25/2024     11/25/2024     Lab Results   Component Value Date    CHOL 162 11/25/2024    CHOL 197 09/10/2021    CHOL 196 11/20/2020     Lab Results   Component Value Date    HDL 50.6 11/25/2024    HDL 53 09/10/2021    HDL 50.6 11/20/2020     Lab Results   Component Value Date    LDLCALC 88 11/25/2024    LDLCALC 116 09/10/2021     Lab Results   Component Value Date    TRIG 115 11/25/2024    TRIG 138 09/10/2021    TRIG 148 11/20/2020     No components found for: \"CHOLHDL\"  Lab Results   Component Value Date    HGBA1C 7.5 (A) 03/28/2025     Other labs not included in the list above were reviewed either before or during this encounter.    History    Medical History[1]  Surgical History[2]  Family History[3]  Social History     Socioeconomic History    Marital status:      Spouse name: Not on file    Number of children: Not on file    Years of education: Not on file    Highest education level: Not on file   Occupational " History    Not on file   Tobacco Use    Smoking status: Never     Passive exposure: Never    Smokeless tobacco: Never   Vaping Use    Vaping status: Never Used   Substance and Sexual Activity    Alcohol use: Never    Drug use: Never    Sexual activity: Not on file   Other Topics Concern    Not on file   Social History Narrative    Not on file     Social Drivers of Health     Financial Resource Strain: Not on file   Food Insecurity: Not on file   Transportation Needs: Not on file   Physical Activity: Not on file   Stress: Not on file   Social Connections: Not on file   Intimate Partner Violence: Not on file   Housing Stability: Not on file     Allergies[4]  Medications Ordered Prior to Encounter[5]  Immunization History   Administered Date(s) Administered    COVID-19, mRNA, LNP-S, PF, 30 mcg/0.3 mL dose 11/12/2021    Flu vaccine (IIV4), preservative free *Check age/dose* 01/22/2020    Flu vaccine, quadrivalent, high-dose, preservative free, age 65y+ (FLUZONE) 09/25/2020, 01/09/2023    Flu vaccine, trivalent, preservative free, HIGH-DOSE, age 65y+ (Fluzone) 09/13/2024    Influenza, Seasonal, Quadrivalent, Adjuvanted 11/12/2021, 10/18/2023    Moderna SARS-CoV-2 Vaccination 03/02/2021, 03/30/2021    Pfizer COVID-19 vaccine, 12 years and older, (30mcg/0.3mL) (Comirnaty) 10/18/2023, 09/13/2024    Pfizer COVID-19 vaccine, bivalent, age 12 years and older (30 mcg/0.3 mL) 01/09/2023, 07/05/2023    Pfizer Gray Cap SARS-CoV-2 05/18/2022    Pneumococcal conjugate vaccine, 20-valent (PREVNAR 20) 11/22/2024    Pneumococcal polysaccharide vaccine, 23-valent, age 2 years and older (PNEUMOVAX 23) 11/30/2020    Tdap vaccine, age 7 year and older (BOOSTRIX, ADACEL) 02/26/2019    Zoster vaccine, recombinant, adult (SHINGRIX) 01/22/2020, 05/15/2020, 06/12/2020     Patient's medical history was reviewed and updated either before or during this encounter.       Ethan Villa MD         [1]   Past Medical History:  Diagnosis Date     Abnormal results of thyroid function studies 08/09/2019    Abnormal thyroid function test    Diabetes mellitus (Multi)     Essential (primary) hypertension 05/15/2020    Benign essential hypertension    Impaired fasting glucose 03/06/2017    IFG (impaired fasting glucose)    Other specified disorders of synovium and tendon, other site 09/11/2015    Achilles tendinosis    Personal history of other mental and behavioral disorders 10/09/2017    History of depression    Restless legs syndrome 02/10/2020    Restless legs syndrome    Unspecified symptoms and signs involving the genitourinary system 05/15/2020    Urinary symptom or sign   [2]   Past Surgical History:  Procedure Laterality Date    COLONOSCOPY  04/08/2014    Complete Colonoscopy    HYSTERECTOMY  04/08/2014    Hysterectomy    TOTAL KNEE ARTHROPLASTY  04/08/2014    Knee Replacement   [3] No family history on file.  [4] No Known Allergies  [5]   Current Outpatient Medications on File Prior to Visit   Medication Sig Dispense Refill    amLODIPine (Norvasc) 10 mg tablet Take 1 tablet (10 mg) by mouth once daily. 90 tablet 3    atorvastatin (Lipitor) 10 mg tablet TAKE 1 TABLET ONCE DAILY 90 tablet 3    calcium-vit D3-mag gly-zinc 400 mg-83.3 mcg -166.7 mg capsule Take by mouth once every 24 hours.      Dexcom G7 Sensor device Use as directed every 10 days to check blood sugars 3 each 11    fenofibrate micronized (Lofibra) 134 mg capsule TAKE 1 CAPSULE ONCE DAILY  WITH BREAKFAST 90 capsule 3    ferrous sulfate, 325 mg ferrous sulfate, (iron) tablet Take 1 tablet (325 mg) by mouth once daily with breakfast.      metFORMIN (Glucophage) 500 mg tablet Take 2 tablets (1,000 mg) by mouth 2 times daily (morning and late afternoon). After 1 week increase to 1 tablet in the morning and 1 tablet in the evening 360 tablet 3    pramipexole (Mirapex) 0.5 mg tablet TAKE 1 TABLET TWICE A  tablet 3    Synthroid 50 mcg tablet TAKE 1 TABLET DAILY IN THE MORNING BEFORE A MEAL  90 tablet 3     No current facility-administered medications on file prior to visit.

## 2025-08-01 ENCOUNTER — OFFICE VISIT (OUTPATIENT)
Dept: PRIMARY CARE | Facility: CLINIC | Age: 70
End: 2025-08-01
Payer: MEDICARE

## 2025-08-01 VITALS
DIASTOLIC BLOOD PRESSURE: 74 MMHG | SYSTOLIC BLOOD PRESSURE: 138 MMHG | TEMPERATURE: 98.4 F | HEART RATE: 72 BPM | WEIGHT: 146 LBS | OXYGEN SATURATION: 98 % | BODY MASS INDEX: 22.87 KG/M2

## 2025-08-01 DIAGNOSIS — F41.9 ANXIETY: ICD-10-CM

## 2025-08-01 DIAGNOSIS — E11.69 TYPE 2 DIABETES MELLITUS WITH OTHER SPECIFIED COMPLICATION, WITHOUT LONG-TERM CURRENT USE OF INSULIN: Primary | ICD-10-CM

## 2025-08-01 DIAGNOSIS — I10 HYPERTENSION, UNSPECIFIED TYPE: ICD-10-CM

## 2025-08-01 DIAGNOSIS — Z13.6 SCREENING FOR CARDIOVASCULAR CONDITION: ICD-10-CM

## 2025-08-01 DIAGNOSIS — E78.2 MIXED HYPERLIPIDEMIA: ICD-10-CM

## 2025-08-01 DIAGNOSIS — E55.9 VITAMIN D DEFICIENCY: ICD-10-CM

## 2025-08-01 DIAGNOSIS — R73.9 HYPERGLYCEMIA: ICD-10-CM

## 2025-08-01 LAB — POC HEMOGLOBIN A1C: 7.2 % (ref 4.2–6.5)

## 2025-08-01 PROCEDURE — 1126F AMNT PAIN NOTED NONE PRSNT: CPT | Performed by: INTERNAL MEDICINE

## 2025-08-01 PROCEDURE — 3075F SYST BP GE 130 - 139MM HG: CPT | Performed by: INTERNAL MEDICINE

## 2025-08-01 PROCEDURE — 3051F HG A1C>EQUAL 7.0%<8.0%: CPT | Performed by: INTERNAL MEDICINE

## 2025-08-01 PROCEDURE — 99214 OFFICE O/P EST MOD 30 MIN: CPT | Performed by: INTERNAL MEDICINE

## 2025-08-01 PROCEDURE — 83036 HEMOGLOBIN GLYCOSYLATED A1C: CPT | Mod: QW | Performed by: INTERNAL MEDICINE

## 2025-08-01 PROCEDURE — 3078F DIAST BP <80 MM HG: CPT | Performed by: INTERNAL MEDICINE

## 2025-08-01 PROCEDURE — 1036F TOBACCO NON-USER: CPT | Performed by: INTERNAL MEDICINE

## 2025-08-01 RX ORDER — ESCITALOPRAM OXALATE 10 MG/1
10 TABLET ORAL DAILY
Qty: 30 TABLET | Refills: 5 | Status: SHIPPED | OUTPATIENT
Start: 2025-08-01 | End: 2026-01-28

## 2025-08-01 ASSESSMENT — PAIN SCALES - GENERAL: PAINLEVEL_OUTOF10: 0-NO PAIN

## 2025-08-07 ENCOUNTER — DOCUMENTATION (OUTPATIENT)
Dept: CARE COORDINATION | Facility: CLINIC | Age: 70
End: 2025-08-07
Payer: MEDICARE

## 2025-08-20 ENCOUNTER — APPOINTMENT (OUTPATIENT)
Dept: CARE COORDINATION | Facility: CLINIC | Age: 70
End: 2025-08-20
Payer: MEDICARE

## 2025-09-04 DIAGNOSIS — R53.83 OTHER FATIGUE: ICD-10-CM

## 2025-09-05 RX ORDER — PRAMIPEXOLE DIHYDROCHLORIDE 0.5 MG/1
0.5 TABLET ORAL 2 TIMES DAILY
Qty: 180 TABLET | Refills: 3 | Status: SHIPPED | OUTPATIENT
Start: 2025-09-05

## 2025-09-10 ENCOUNTER — APPOINTMENT (OUTPATIENT)
Dept: CARE COORDINATION | Facility: CLINIC | Age: 70
End: 2025-09-10
Payer: MEDICARE